# Patient Record
Sex: MALE | Race: BLACK OR AFRICAN AMERICAN
[De-identification: names, ages, dates, MRNs, and addresses within clinical notes are randomized per-mention and may not be internally consistent; named-entity substitution may affect disease eponyms.]

---

## 2017-10-16 ENCOUNTER — HOSPITAL ENCOUNTER (EMERGENCY)
Dept: HOSPITAL 87 - ER | Age: 42
LOS: 1 days | Discharge: LEFT BEFORE DISCHARGE | End: 2017-10-17
Payer: SELF-PAY

## 2017-10-16 VITALS — WEIGHT: 174.17 LBS | HEIGHT: 70 IN | BODY MASS INDEX: 24.93 KG/M2

## 2017-10-16 VITALS — SYSTOLIC BLOOD PRESSURE: 138 MMHG | DIASTOLIC BLOOD PRESSURE: 82 MMHG

## 2017-10-16 DIAGNOSIS — R10.84: Primary | ICD-10-CM

## 2017-10-16 DIAGNOSIS — R11.2: ICD-10-CM

## 2017-10-16 DIAGNOSIS — Z53.21: ICD-10-CM

## 2020-09-28 ENCOUNTER — HOSPITAL ENCOUNTER (INPATIENT)
Dept: HOSPITAL 72 - EMR | Age: 45
LOS: 3 days | Discharge: HOME | DRG: 282 | End: 2020-10-01
Payer: MEDICAID

## 2020-09-28 VITALS — DIASTOLIC BLOOD PRESSURE: 107 MMHG | SYSTOLIC BLOOD PRESSURE: 138 MMHG

## 2020-09-28 VITALS — DIASTOLIC BLOOD PRESSURE: 78 MMHG | SYSTOLIC BLOOD PRESSURE: 125 MMHG

## 2020-09-28 VITALS — DIASTOLIC BLOOD PRESSURE: 92 MMHG | SYSTOLIC BLOOD PRESSURE: 149 MMHG

## 2020-09-28 VITALS — DIASTOLIC BLOOD PRESSURE: 72 MMHG | SYSTOLIC BLOOD PRESSURE: 124 MMHG

## 2020-09-28 VITALS — BODY MASS INDEX: 25.19 KG/M2 | WEIGHT: 166.23 LBS | HEIGHT: 68 IN

## 2020-09-28 DIAGNOSIS — D72.819: ICD-10-CM

## 2020-09-28 DIAGNOSIS — K85.20: Primary | ICD-10-CM

## 2020-09-28 DIAGNOSIS — K92.2: ICD-10-CM

## 2020-09-28 DIAGNOSIS — F10.10: ICD-10-CM

## 2020-09-28 DIAGNOSIS — D63.8: ICD-10-CM

## 2020-09-28 DIAGNOSIS — E46: ICD-10-CM

## 2020-09-28 DIAGNOSIS — E53.8: ICD-10-CM

## 2020-09-28 DIAGNOSIS — N17.9: ICD-10-CM

## 2020-09-28 DIAGNOSIS — F12.90: ICD-10-CM

## 2020-09-28 DIAGNOSIS — E87.1: ICD-10-CM

## 2020-09-28 DIAGNOSIS — M62.82: ICD-10-CM

## 2020-09-28 DIAGNOSIS — E86.0: ICD-10-CM

## 2020-09-28 DIAGNOSIS — K29.70: ICD-10-CM

## 2020-09-28 LAB
ADD MANUAL DIFF: NO
ALBUMIN SERPL-MCNC: 5 G/DL (ref 3.4–5)
ALBUMIN/GLOB SERPL: 1.4 {RATIO} (ref 1–2.7)
ALP SERPL-CCNC: 57 U/L (ref 46–116)
ALT SERPL-CCNC: 33 U/L (ref 12–78)
ANION GAP SERPL CALC-SCNC: 13 MMOL/L (ref 5–15)
APPEARANCE UR: CLEAR
APTT PPP: (no result) S
AST SERPL-CCNC: 32 U/L (ref 15–37)
BASOPHILS NFR BLD AUTO: 4.8 % (ref 0–2)
BILIRUB SERPL-MCNC: 0.8 MG/DL (ref 0.2–1)
BUN SERPL-MCNC: 75 MG/DL (ref 7–18)
CALCIUM SERPL-MCNC: 10.2 MG/DL (ref 8.5–10.1)
CHLORIDE SERPL-SCNC: 89 MMOL/L (ref 98–107)
CK SERPL-CCNC: 635 U/L (ref 26–308)
CO2 SERPL-SCNC: 25 MMOL/L (ref 21–32)
CREAT SERPL-MCNC: 3.2 MG/DL (ref 0.55–1.3)
EOSINOPHIL NFR BLD AUTO: 0.6 % (ref 0–3)
ERYTHROCYTE [DISTWIDTH] IN BLOOD BY AUTOMATED COUNT: 11.5 % (ref 11.6–14.8)
GLOBULIN SER-MCNC: 3.5 G/DL
GLUCOSE UR STRIP-MCNC: NEGATIVE MG/DL
HCT VFR BLD CALC: 58.2 % (ref 42–52)
HGB BLD-MCNC: 19.6 G/DL (ref 14.2–18)
KETONES UR QL STRIP: NEGATIVE
LEUKOCYTE ESTERASE UR QL STRIP: NEGATIVE
LYMPHOCYTES NFR BLD AUTO: 32.1 % (ref 20–45)
MCV RBC AUTO: 83 FL (ref 80–99)
MONOCYTES NFR BLD AUTO: 8.4 % (ref 1–10)
NEUTROPHILS NFR BLD AUTO: 54.2 % (ref 45–75)
NITRITE UR QL STRIP: NEGATIVE
PH UR STRIP: 5 [PH] (ref 4.5–8)
PLATELET # BLD: 271 K/UL (ref 150–450)
POTASSIUM SERPL-SCNC: 4.3 MMOL/L (ref 3.5–5.1)
PROT UR QL STRIP: (no result)
RBC # BLD AUTO: 7.05 M/UL (ref 4.7–6.1)
SODIUM SERPL-SCNC: 127 MMOL/L (ref 136–145)
SP GR UR STRIP: 1.02 (ref 1–1.03)
UROBILINOGEN UR-MCNC: NORMAL MG/DL (ref 0–1)
WBC # BLD AUTO: 7.6 K/UL (ref 4.8–10.8)

## 2020-09-28 PROCEDURE — 80307 DRUG TEST PRSMV CHEM ANLYZR: CPT

## 2020-09-28 PROCEDURE — 84100 ASSAY OF PHOSPHORUS: CPT

## 2020-09-28 PROCEDURE — 83690 ASSAY OF LIPASE: CPT

## 2020-09-28 PROCEDURE — 84550 ASSAY OF BLOOD/URIC ACID: CPT

## 2020-09-28 PROCEDURE — 81003 URINALYSIS AUTO W/O SCOPE: CPT

## 2020-09-28 PROCEDURE — 87081 CULTURE SCREEN ONLY: CPT

## 2020-09-28 PROCEDURE — 86703 HIV-1/HIV-2 1 RESULT ANTBDY: CPT

## 2020-09-28 PROCEDURE — 86709 HEPATITIS A IGM ANTIBODY: CPT

## 2020-09-28 PROCEDURE — 96374 THER/PROPH/DIAG INJ IV PUSH: CPT

## 2020-09-28 PROCEDURE — 86140 C-REACTIVE PROTEIN: CPT

## 2020-09-28 PROCEDURE — 86803 HEPATITIS C AB TEST: CPT

## 2020-09-28 PROCEDURE — 96375 TX/PRO/DX INJ NEW DRUG ADDON: CPT

## 2020-09-28 PROCEDURE — 71045 X-RAY EXAM CHEST 1 VIEW: CPT

## 2020-09-28 PROCEDURE — 85025 COMPLETE CBC W/AUTO DIFF WBC: CPT

## 2020-09-28 PROCEDURE — 96361 HYDRATE IV INFUSION ADD-ON: CPT

## 2020-09-28 PROCEDURE — 84484 ASSAY OF TROPONIN QUANT: CPT

## 2020-09-28 PROCEDURE — 82270 OCCULT BLOOD FECES: CPT

## 2020-09-28 PROCEDURE — 82746 ASSAY OF FOLIC ACID SERUM: CPT

## 2020-09-28 PROCEDURE — 83880 ASSAY OF NATRIURETIC PEPTIDE: CPT

## 2020-09-28 PROCEDURE — 84443 ASSAY THYROID STIM HORMONE: CPT

## 2020-09-28 PROCEDURE — 83735 ASSAY OF MAGNESIUM: CPT

## 2020-09-28 PROCEDURE — 82150 ASSAY OF AMYLASE: CPT

## 2020-09-28 PROCEDURE — 84300 ASSAY OF URINE SODIUM: CPT

## 2020-09-28 PROCEDURE — 93005 ELECTROCARDIOGRAM TRACING: CPT

## 2020-09-28 PROCEDURE — 99291 CRITICAL CARE FIRST HOUR: CPT

## 2020-09-28 PROCEDURE — 83605 ASSAY OF LACTIC ACID: CPT

## 2020-09-28 PROCEDURE — 80053 COMPREHEN METABOLIC PANEL: CPT

## 2020-09-28 PROCEDURE — 82550 ASSAY OF CK (CPK): CPT

## 2020-09-28 PROCEDURE — 86705 HEP B CORE ANTIBODY IGM: CPT

## 2020-09-28 PROCEDURE — 74176 CT ABD & PELVIS W/O CONTRAST: CPT

## 2020-09-28 PROCEDURE — 36415 COLL VENOUS BLD VENIPUNCTURE: CPT

## 2020-09-28 PROCEDURE — 87340 HEPATITIS B SURFACE AG IA: CPT

## 2020-09-28 PROCEDURE — 82607 VITAMIN B-12: CPT

## 2020-09-28 PROCEDURE — 80061 LIPID PANEL: CPT

## 2020-09-28 PROCEDURE — 82977 ASSAY OF GGT: CPT

## 2020-09-28 PROCEDURE — 83036 HEMOGLOBIN GLYCOSYLATED A1C: CPT

## 2020-09-28 RX ADMIN — PANTOPRAZOLE SODIUM SCH MG: 40 INJECTION, POWDER, FOR SOLUTION INTRAVENOUS at 21:21

## 2020-09-28 NOTE — NUR
ED Nurse Note:

Patient has abnormal CR 3.2. Dr. Vazquez notified and ordered CT abd/pelvis 
without contrast.

## 2020-09-28 NOTE — NUR
NURSE NOTES:

Dr Valiente called asking for the pt's room no.asked for diet order but no order given, said 
he had not seen the pt.

## 2020-09-28 NOTE — CONSULTATION
Consult Note


Consult Note


I am asked to evaluate the patient at the request of Dr. Raines for renal failure





45-year-old male history of pancreatitis presents with recurrent epigastric pain

started 7 days ago patient reports drinking after his brothers death, he 

endorses sharp pain aggravated with alcohol no alleviating factors severity is 

moderate, constant he endorses nausea vomiting, no fevers no chills patient 

presents for evaluation and treatment


 No Known Allergies (Unverified , 1/17/20)





Patient has history of drinking alcohol and marijuana abuse





COVID-19 Screening


Contact w/high risk pt:  No


Experienced COVID-19 symptoms?:  No


COVID-19 Testing performed PTA:  No








Past Medical History:  No History, Except For


Hx Cardiac Problems:  No - pancretitis














Vital Signs








  Date Time  Temp Pulse Resp B/P (MAP) Pulse Ox O2 Delivery O2 Flow Rate FiO2


 


9/28/20 09:56 97.9 70 17 213/100 (137) 98 Room Air  








Sp02 EP Interpretation:  reviewed, normal








PHYSICAL EXAMINATION:


GENERAL:  Pleasant  man, seen in his room.


HEENT:  Normocephalic and atraumatic.  Sclerae anicteric.  Oropharynx


clear.


NECK:  Supple.


CHEST:  Clear to auscultation.


CARDIOVASCULAR:  Revealed a regular rate.


ABDOMEN:  Soft with minimal central tenderness to palpation without


guarding or rebound.


EXTREMITIES:  Revealed no edema.


NEUROLOGIC:  Exam was noted.





LABORATORY DATA:  Reviewed and most notably the patient is hemoconcentrated


and also he has a low sodium of 125 with renal failure and a creatinine of


2.2.  His total CPK was somewhat elevated, but he is receiving IV


fluids.





.


Assessment/Plan





Imp:


Acute renal failure, dehydration, possible rhabdo


Hyponatremia, electrolyte imbalances


EtOH abuse


Abdominal pain, gastritis, history of pancreatitis


Positive drug screen











Plan:


Hydrate with saline


Monitor renal parameters


Monitor hemoglobin hematocrit


Per orders











Jan Felipe MD            Sep 28, 2020 14:11

## 2020-09-28 NOTE — NUR
NURSE NOTES:

Dr. Felipe called and gave the following orders:

- NPO except meds

- heparin 5000 Q12hr

- He will put pain meds in himself.



Will input orders and continue to monitor.

## 2020-09-28 NOTE — EMERGENCY ROOM REPORT
History of Present Illness


General


Chief Complaint:  Abdominal Pain


Source:  Patient





Present Illness


HPI


45-year-old male history of pancreatitis presents with recurrent epigastric pain

started 7 days ago patient reports drinking after his brothers death, he 

endorses sharp pain aggravated with alcohol no alleviating factors severity is 

moderate, constant he endorses nausea vomiting, no fevers no chills patient 

presents for evaluation and treatment


Allergies:  


Coded Allergies:  


     No Known Allergies (Unverified , 1/17/20)





COVID-19 Screening


Contact w/high risk pt:  No


Experienced COVID-19 symptoms?:  No


COVID-19 Testing performed PTA:  No





Patient History


Past Medical History:  see triage record


Reviewed Nursing Documentation:  PMH: Agreed; PSxH: Agreed





Nursing Documentation-PMH


Past Medical History:  No History, Except For


Hx Cardiac Problems:  No - pancretitis





Review of Systems


All Other Systems:  negative except mentioned in HPI





Physical Exam





Vital Signs








  Date Time  Temp Pulse Resp B/P (MAP) Pulse Ox O2 Delivery O2 Flow Rate FiO2


 


9/28/20 09:56 97.9 70 17 213/100 (137) 98 Room Air  








Sp02 EP Interpretation:  reviewed, normal


General Appearance:  well appearing, no apparent distress, alert


Head:  normocephalic, atraumatic


Eyes:  bilateral eye PERRL, bilateral eye EOMI


ENT:  uvula midline, moist mucus membranes


Neck:  supple, thyroid normal, supple/symm/no masses


Respiratory:  lungs clear, no respiratory distress, no retraction, no accessory 

muscle use


Cardiovascular #1:  normal peripheral pulses, no edema, no gallop, no murmur, 

tachycardia


Gastrointestinal:  soft, no guarding, no rebound, tenderness - Epigastrically


Musculoskeletal:  normal inspection


Neurologic:  alert, oriented x3


Psychiatric:  mood/affect normal


Skin:  no rash, warm/dry





Procedures


Critical Care Time


Critical Care Time


Given the critical condition in which the patient arrived, the patient was 

immediately assessed by myself and the nurse, and cardiac monitoring initiated 

due to the potential for rapid decompensation of the patient's clinical 

condition. During the course of the patient's stay, I spent a considerable 

amount of time at the bedside performing serial re-evaluations of the patient's 

hemodynamic and clinical status because of the recognized potential threat to 

life or limb in this condition. I then had a chance to review not only all of 

the available current laboratory and radiographic studies obtained today, but I 

also reviewed old records available to me at the time. Additionally, any anci

[facility] information available including paramedic records were reviewed. 

Sequential vital signs were obtained. 





Critical Care time of 33 minutes was performed exclusive of billable procedures.





Medical Decision Making


Diagnostic Impression:  


   Primary Impression:  


   Abdominal pain


   Qualified Codes:  R10.9 - Unspecified abdominal pain


   Additional Impressions:  


   RAY (acute kidney injury)


   GI bleed


   Qualified Codes:  K92.2 - Gastrointestinal hemorrhage, unspecified


ER Course


45-year-old male presents with epigastric pain differential diagnosis includes 

alcoholic gastritis, pancreatitis, GI bleed, ulcer


Reevaluation 10:21 AM patient is vomiting blood


Patient given Protonix, patient also found to have an RAY, patient resuscitated 

with NS. 


Protonix for possible GI bleed


Patient admitted to Dr. Don Raines





Laboratory Tests








Test


 9/28/20


10:16 9/28/20


12:15


 


White Blood Count


 7.6 K/UL


(4.8-10.8) 





 


Red Blood Count


 7.05 M/UL


(4.70-6.10)  H 





 


Hemoglobin


 19.6 G/DL


(14.2-18.0)  *H 





 


Hematocrit


 58.2 %


(42.0-52.0)  H 





 


Mean Corpuscular Volume 83 FL (80-99)   


 


Mean Corpuscular Hemoglobin


 27.8 PG


(27.0-31.0) 





 


Mean Corpuscular Hemoglobin


Concent 33.7 G/DL


(32.0-36.0) 





 


Red Cell Distribution Width


 11.5 %


(11.6-14.8)  L 





 


Platelet Count


 271 K/UL


(150-450) 





 


Mean Platelet Volume


 6.7 FL


(6.5-10.1) 





 


Neutrophils (%) (Auto)


 54.2 %


(45.0-75.0) 





 


Lymphocytes (%) (Auto)


 32.1 %


(20.0-45.0) 





 


Monocytes (%) (Auto)


 8.4 %


(1.0-10.0) 





 


Eosinophils (%) (Auto)


 0.6 %


(0.0-3.0) 





 


Basophils (%) (Auto)


 4.8 %


(0.0-2.0)  H 





 


Sodium Level


 127 MMOL/L


(136-145)  L 





 


Potassium Level


 4.3 MMOL/L


(3.5-5.1) 





 


Chloride Level


 89 MMOL/L


()  L 





 


Carbon Dioxide Level


 25 MMOL/L


(21-32) 





 


Anion Gap


 13 mmol/L


(5-15) 





 


Blood Urea Nitrogen


 75 mg/dL


(7-18)  H 





 


Creatinine


 3.2 MG/DL


(0.55-1.30)  H 





 


Estimated Glomerular


Filtration Rate 25.3 mL/min


(>60) 





 


Glucose Level


 144 MG/DL


()  H 





 


Calcium Level


 10.2 MG/DL


(8.5-10.1)  H 





 


Total Bilirubin


 0.8 MG/DL


(0.2-1.0) 





 


Aspartate Amino Transferase


(AST) 32 U/L (15-37)


 





 


Alanine Aminotransferase (ALT)


 33 U/L (12-78)


 





 


Alkaline Phosphatase


 57 U/L


() 





 


Total Creatine Kinase


 635 U/L


()  H 





 


Troponin I


 0.003 ng/mL


(0.000-0.056) 





 


Total Protein


 8.5 G/DL


(6.4-8.2)  H 





 


Albumin


 5.0 G/DL


(3.4-5.0) 





 


Globulin 3.5 g/dL   


 


Albumin/Globulin Ratio 1.4 (1.0-2.7)   


 


Lipase


 345 U/L


() 





 


Urine Opiates Screen


 


 Negative


(NEGATIVE)


 


Urine Barbiturates Screen


 


 Negative


(NEGATIVE)


 


Phencyclidine (PCP) Screen


 


 Negative


(NEGATIVE)


 


Urine Amphetamines Screen


 


 Negative


(NEGATIVE)


 


Urine Benzodiazepines Screen


 


 Negative


(NEGATIVE)


 


Urine Cocaine Screen


 


 Negative


(NEGATIVE)


 


Urine Marijuana (THC) Screen


 


 Positive


(NEGATIVE)  H








Microbiology








 Date/Time


Source Procedure


Growth Status





 


 9/28/20 11:25


Nasopharynx SARS-CoV-2 RdRp Gene Assay - Final Complete








EKG Diagnostic Results


EKG Time:  10:00


EP Interpretation:  Sinus tachycardia, rate 105, QTc 417, no acute ST lesions, 

normal axis





Rhythm Strip Diag. Results


Rhythm Strip Time:  10:11


EP Interpretation:  yes


Rate:  110


Rhythm:  other - Sinus tachycardia





Chest X-Ray Diagnostic Results


Chest X-Ray Diagnostic Results :  


   Chest X-Ray Ordered:  Yes


   # of Views/Limited/Complete:  1 View


   Indication:  Chest Pain


   EP Interpretation:  Yes


   Interpretation:  no consolidation, no effusion, no pneumothorax, no acute 

cardiopulmonary disease


   Impression:  No acute disease


   Electronically Signed by:  Wesley Vazquez MD





CT/MRI/US Diagnostic Results


CT/MRI/US Diagnostic Results :  


   Impression


Procedure: CT Abdomen Pelvis WO Contrast


Indication: Abdominal pain


 


Technique: Spiral acquisitions obtained through the abdomen and pelvis. No oral


contrast utilized, per emergency room physician request No IV contrast utilized,

 per


referring physician request.. Multiplanar reconstructions were generated. Total 

dose


length product 236mGycm. CTDIvol(s) 4 mGy. Dose reduction achieved using 

automated


exposure control


 


 


Comparison: 1/17/2020


 


Findings: Lack of enteric contrast limits assessment of the GI tract. No 

evidence of


diverticulosis or diverticulitis. The appendix is normal. No small bowel 

distention.


No free or loculated intraperitoneal gas or fluid is evident. Distal esophagus,


stomach, duodenum are unremarkable.


 


Lack of IV contrast limits assessment of the solid organs. The liver, 

gallbladder,


bile ducts, pancreas, spleen, adrenals, kidneys are unremarkable. No 

retroperitoneal


or mesenteric mass or adenopathy. No pelvic mass or adenopathy.


 


The included lung bases are clear. The bones are unremarkable.


 


Impression: Limited assessment of the GI tract, due to lack of enteric contrast


administration


 


No definite acute or significant abnormality


 


 


 


 


 


 


 


The CT scanner at [facility] is accredited by the American College 

of


Radiology and the scans are performed using protocols designed to limit radi

ation


exposure to as low as reasonably achievable to attain images of sufficient 

resolution


adequate for diagnostic evaluation.


 














Dictated By:    Ted De Jesus MD                                 


Electronically Signed By:Ted De Jesus MD                                 


Signed Date/Time09/28/20 1412                                   








CC: Wesley Vazquez [id number]





Last Vital Signs








  Date Time  Temp Pulse Resp B/P (MAP) Pulse Ox O2 Delivery O2 Flow Rate FiO2


 


9/28/20 09:56 97.9 70 17 213/100 (137) 98 Room Air  








Disposition:  ADMITTED AS INPATIENT


Condition:  Critical


Scripts


No Active Prescriptions or Reported Meds











Wesley Vazquez MD          Sep 28, 2020 10:11

## 2020-09-28 NOTE — NUR
NURSE NOTES:

Dr. Raines called back saying that Matteo already put orders and to consult Eldonusha. Will 
call Dr. Felipe for a diet and DVT prophylaxis order. Will also ask for something for 
pain.

## 2020-09-28 NOTE — NUR
ED Nurse Note:

TRANSFER TO FLOOR:

Patient transferred to  as ordered.   Report given to JUAREZ Valdez. Patient 
transferred to SDU with JUAREZ Meyer and SADIE Dobson with all his belongings.

## 2020-09-28 NOTE — GENERAL PROGRESS NOTE
Subjective


Allergies:  


Coded Allergies:  


     No Known Allergies (Unverified , 1/17/20)





Objective





Last 24 Hour Vital Signs








  Date Time  Temp Pulse Resp B/P (MAP) Pulse Ox O2 Delivery O2 Flow Rate FiO2


 


9/28/20 16:00      Room Air  


 


9/28/20 15:36      Room Air  


 


9/28/20 15:35  73      


 


9/28/20 14:43  74 14 136/89 99 Room Air  


 


9/28/20 14:00 98.6 74 14 124/72 99 Room Air  


 


9/28/20 12:54 98.5 82 16 149/92 99 Room Air  


 


9/28/20 10:44 97.8       


 


9/28/20 10:37  98 16 138/107 99 Room Air  


 


9/28/20 10:15  110 18   Room Air  


 


9/28/20 09:56 97.9 70 17 213/100 (137) 98 Room Air  








Laboratory Tests


9/28/20 10:16: 


White Blood Count 7.6, Red Blood Count 7.05H, Hemoglobin 19.6*H, Hematocrit 

58.2H, Mean Corpuscular Volume 83, Mean Corpuscular Hemoglobin 27.8, Mean 

Corpuscular Hemoglobin Concent 33.7, Red Cell Distribution Width 11.5L, Platelet

Count 271, Mean Platelet Volume 6.7, Neutrophils (%) (Auto) 54.2, Lymphocytes 

(%) (Auto) 32.1, Monocytes (%) (Auto) 8.4, Eosinophils (%) (Auto) 0.6, Basophils

(%) (Auto) 4.8H, Sodium Level 127L, Potassium Level 4.3, Chloride Level 89L, 

Carbon Dioxide Level 25, Anion Gap 13, Blood Urea Nitrogen 75H, Creatinine 3.2H,

Estimat Glomerular Filtration Rate 25.3, Glucose Level 144H, Calcium Level 10.2H

, Total Bilirubin 0.8, Aspartate Amino Transf (AST/SGOT) 32, Alanine Am

inotransferase (ALT/SGPT) 33, Alkaline Phosphatase 57, Total Creatine Kinase 

635H, Troponin I 0.003, Total Protein 8.5H, Albumin 5.0, Globulin 3.5, 

Albumin/Globulin Ratio 1.4, Lipase 345


9/28/20 12:15: 


Urine Color Pale yellow, Urine Appearance Clear, Urine pH 5, Urine Specific 

Gravity 1.020, Urine Protein 2+H, Urine Glucose (UA) Negative, Urine Ketones 

Negative, Urine Blood 2+H, Urine Nitrite Negative, Urine Bilirubin Negative, 

Urine Urobilinogen Normal, Urine Leukocyte Esterase Negative, Urine RBC 2-4H, 

Urine WBC 2-4, Urine Squamous Epithelial Cells Occasional, Urine Bacteria Few, 

Urine Hyaline Casts 10-15H, Urine Random Sodium < 20L, Urine Opiates Screen 

Negative, Urine Barbiturates Screen Negative, Phencyclidine (PCP) Screen 

Negative, Urine Amphetamines Screen Negative, Urine Benzodiazepines Screen 

Negative, Urine Cocaine Screen Negative, Urine Marijuana (THC) Screen PositiveH


Height (Feet):  5


Height (Inches):  8.00


Weight (Pounds):  172





Assessment/Plan


Assessment/Plan:


Assessment


- abd pain, ? EtOH gastritis


- Etoh abuse


- dehydration, free water deficit


- h/o pancreatitis





Recommendations


- clear liquid


- PPI


- Follow labs and exam


- check OB





Thank you


MD Janice Camacho Payman MD             Sep 28, 2020 20:08

## 2020-09-28 NOTE — NUR
ED Nurse Note:

Patient c/o abdominal pain, cramping, N/V. Patient vomited pink colored emesis, 
approximately 30ml @ 1017. Dr. Baker notified and came to bedside. Placed 
patient in semi-vasquez position. Increased pain with high vasquez position. 
Emesis bag/ urinal at bedside. Provided comfort measures. Bed in lowset 
position.

## 2020-09-28 NOTE — DIAGNOSTIC IMAGING REPORT
Indication: Abdominal pain

 

Technique: Spiral acquisitions obtained through the abdomen and pelvis. No oral

contrast utilized, per emergency room physician request No IV contrast utilized,  per

referring physician request.. Multiplanar reconstructions were generated. Total dose

length product 236mGycm. CTDIvol(s) 4 mGy. Dose reduction achieved using automated

exposure control

 

 

Comparison: 1/17/2020

 

Findings: Lack of enteric contrast limits assessment of the GI tract. No evidence of

diverticulosis or diverticulitis. The appendix is normal. No small bowel distention.

No free or loculated intraperitoneal gas or fluid is evident. Distal esophagus,

stomach, duodenum are unremarkable.

 

Lack of IV contrast limits assessment of the solid organs. The liver, gallbladder,

bile ducts, pancreas, spleen, adrenals, kidneys are unremarkable. No retroperitoneal

or mesenteric mass or adenopathy. No pelvic mass or adenopathy.

 

The included lung bases are clear. The bones are unremarkable.

 

Impression: Limited assessment of the GI tract, due to lack of enteric contrast

administration

 

No definite acute or significant abnormality

 

 

 

 

 

 

 

The CT scanner at [facility] is accredited by the American College of

Radiology and the scans are performed using protocols designed to limit radiation

exposure to as low as reasonably achievable to attain images of sufficient resolution

adequate for diagnostic evaluation.

## 2020-09-28 NOTE — CONSULTATION
DATE OF CONSULTATION:  09/28/2020

GASTROENTEROLOGY CONSULTATION REPORT



CONSULTING PHYSICIAN:  So Camacho MD.



CHIEF COMPLAINT:  I was asked to see this patient for evaluation of

abdominal pain.



HISTORY OF PRESENT ILLNESS:  The patient is a 45-year-old 

man with a history of alcohol use who comes into the hospital with a 4 to

5 days of abdominal pain.  He has been vomiting for about 2 days.  He

reports the color has been green in the emesis.  He has had no bowel

movements.  He drinks few beers and also whisky daily.  He has had a past

history of pancreatitis.



FAMILY HISTORY:  Positive for pancreatitis in the mother.



SOCIAL HISTORY:  Patient is .  He has 4 children of his own and 4

adopted children.  He smokes marijuana and drinks alcohol as described.



REVIEW OF SYSTEMS:  The patient reports has drank red color juice before

his emesis today and therefore the red colored emesis in the emergency

room may have been the juice color.



PHYSICAL EXAMINATION:

GENERAL:  Pleasant  man, seen in his room.

HEENT:  Normocephalic and atraumatic.  Sclerae anicteric.  Oropharynx

clear.

NECK:  Supple.

CHEST:  Clear to auscultation.

CARDIOVASCULAR:  Revealed a regular rate.

ABDOMEN:  Soft with minimal central tenderness to palpation without

guarding or rebound.

EXTREMITIES:  Revealed no edema.

NEUROLOGIC:  Exam was noted.



LABORATORY DATA:  Reviewed and most notably the patient is hemoconcentrated

and also he has a low sodium of 125 with renal failure and a creatinine of

2.2.  His total CPK was somewhat elevated, but he is receiving IV

fluids.



ASSESSMENT:  This patient presents with abdominal pain, nausea, vomiting,

and he is volume depleted.  He should receive aggressive IV hydration and

his counts and numbers should be all followed.  A proton pump inhibitor

can be given and diet can be slowly advanced.  He may have had

upper gastrointestinal bleeding, but his blood counts can be followed and

the stools can be checked for Hemoccult.  A consideration can be made to

do an endoscopy if necessary.  He was strongly advised to discontinue

alcohol.



RECOMMENDATIONS:  Per above discussion and per orders written in the

chart.



Thank you for asking me to participate in the care of this patient.









  ______________________________________________

  So Camacho M.D.





DR:  YULY

D:  09/28/2020 19:44

T:  09/28/2020 19:56

JOB#:  [id number]

CC:



JOSE

## 2020-09-28 NOTE — NUR
NURSE HAND-OFF REPORT: 



Important Events on Shift:N/A

Patient Status: Stable

Diet: NPO



Pending Orders: N/A

Pending Results/Labs:

Pending MD notification:N/A



Latest Vital Signs: Temperature 98.5 , Pulse 73 , B/P 149 /92 , Respiratory Rate 16 , O2 SAT 
99 , Room Air, O2 Flow Rate .  

Vital Sign Comment: N/A



EKG Rhythm: Sinus Rhythm

Rhythm change?: N 

MD Notified?: -

MD Response: 



Latest Peña Fall Score: 0  

Fall Risk: Low Risk 

Safety Measures: Call light Within Reach, Bed Alarm Zone 1, Side Rails Side Rails x2, Bed 
position Low and Locked.

Fall Precautions: 



Report given to Beena Eaton RN.

## 2020-09-28 NOTE — NUR
NURSE NOTES:

Received pt ,a new admission fr ED brought to SDU per bed awake alert oriented accompanied 
by a transporter and ER RN Kristie.Pt on RA noted no resp distress,denies abd pain at this 
time,SR on the monitor,IV site to LH intact ,SR up x2 HOB elevated,bed lock in lowest 
position,call bell placed at bedside,instructed to use if assistance needed,verbalized 
understanding,will continue with plans of care.

## 2020-09-28 NOTE — NUR
ED Nurse Note:

Patient presents to ER due to right lower abdominal pain with N/V (bile). 
Patient was recently d/c from [facility] 2 days ago for same complaint. Patient 
reports he started drinking  beer 2 cans a day x 1 weekn as his brother 
recently passed away. Reports no cough, loss of taste or smell. Patient had 
Covid test and it was negative. Patient awake, alert, oriented x 4. Regular, 
unlabored breathing noted. Patient has mask on. Placed patient on cardiac 
monitor. Bed in lowest position. Call light within reach.

## 2020-09-28 NOTE — NUR
ED Nurse Note:

Patient resting in bed. Reports no pain. Bed in lowest position. Urinal at 
bedside.

## 2020-09-29 VITALS — DIASTOLIC BLOOD PRESSURE: 89 MMHG | SYSTOLIC BLOOD PRESSURE: 126 MMHG

## 2020-09-29 VITALS — SYSTOLIC BLOOD PRESSURE: 124 MMHG | DIASTOLIC BLOOD PRESSURE: 71 MMHG

## 2020-09-29 VITALS — DIASTOLIC BLOOD PRESSURE: 71 MMHG | SYSTOLIC BLOOD PRESSURE: 121 MMHG

## 2020-09-29 VITALS — SYSTOLIC BLOOD PRESSURE: 119 MMHG | DIASTOLIC BLOOD PRESSURE: 63 MMHG

## 2020-09-29 VITALS — DIASTOLIC BLOOD PRESSURE: 68 MMHG | SYSTOLIC BLOOD PRESSURE: 127 MMHG

## 2020-09-29 VITALS — SYSTOLIC BLOOD PRESSURE: 128 MMHG | DIASTOLIC BLOOD PRESSURE: 78 MMHG

## 2020-09-29 LAB
ADD MANUAL DIFF: NO
ALBUMIN SERPL-MCNC: 3.3 G/DL (ref 3.4–5)
ALBUMIN/GLOB SERPL: 1.6 {RATIO} (ref 1–2.7)
ALP SERPL-CCNC: 37 U/L (ref 46–116)
ALT SERPL-CCNC: 36 U/L (ref 12–78)
ANION GAP SERPL CALC-SCNC: 7 MMOL/L (ref 5–15)
AST SERPL-CCNC: 30 U/L (ref 15–37)
BASOPHILS NFR BLD AUTO: 1.7 % (ref 0–2)
BILIRUB SERPL-MCNC: 0.4 MG/DL (ref 0.2–1)
BUN SERPL-MCNC: 43 MG/DL (ref 7–18)
CALCIUM SERPL-MCNC: 7.6 MG/DL (ref 8.5–10.1)
CHLORIDE SERPL-SCNC: 101 MMOL/L (ref 98–107)
CHOLEST SERPL-MCNC: 99 MG/DL (ref ?–200)
CK SERPL-CCNC: 497 U/L (ref 26–308)
CO2 SERPL-SCNC: 26 MMOL/L (ref 21–32)
CREAT SERPL-MCNC: 1.9 MG/DL (ref 0.55–1.3)
EOSINOPHIL NFR BLD AUTO: 3 % (ref 0–3)
ERYTHROCYTE [DISTWIDTH] IN BLOOD BY AUTOMATED COUNT: 11.6 % (ref 11.6–14.8)
GAMMA GLUTAMYL TRANSPEPTIDASE: 18 U/L (ref 5–85)
GLOBULIN SER-MCNC: 2.1 G/DL
HCT VFR BLD CALC: 38.1 % (ref 42–52)
HDLC SERPL-MCNC: 34 MG/DL (ref 40–60)
HGB BLD-MCNC: 12.9 G/DL (ref 14.2–18)
LYMPHOCYTES NFR BLD AUTO: 36.7 % (ref 20–45)
MCV RBC AUTO: 83 FL (ref 80–99)
MONOCYTES NFR BLD AUTO: 9.9 % (ref 1–10)
NEUTROPHILS NFR BLD AUTO: 48.7 % (ref 45–75)
PHOSPHATE SERPL-MCNC: 2.4 MG/DL (ref 2.5–4.9)
PLATELET # BLD: 203 K/UL (ref 150–450)
POTASSIUM SERPL-SCNC: 3.6 MMOL/L (ref 3.5–5.1)
RBC # BLD AUTO: 4.57 M/UL (ref 4.7–6.1)
SODIUM SERPL-SCNC: 134 MMOL/L (ref 136–145)
TRIGL SERPL-MCNC: 76 MG/DL (ref 30–150)
WBC # BLD AUTO: 6.7 K/UL (ref 4.8–10.8)

## 2020-09-29 RX ADMIN — DIBASIC SODIUM PHOSPHATE, MONOBASIC POTASSIUM PHOSPHATE AND MONOBASIC SODIUM PHOSPHATE SCH MG: 852; 155; 130 TABLET ORAL at 13:27

## 2020-09-29 RX ADMIN — HEPARIN SODIUM SCH UNITS: 5000 INJECTION INTRAVENOUS; SUBCUTANEOUS at 08:44

## 2020-09-29 RX ADMIN — PANTOPRAZOLE SODIUM SCH MG: 40 INJECTION, POWDER, FOR SOLUTION INTRAVENOUS at 08:43

## 2020-09-29 RX ADMIN — HEPARIN SODIUM SCH UNITS: 5000 INJECTION INTRAVENOUS; SUBCUTANEOUS at 21:26

## 2020-09-29 RX ADMIN — DIBASIC SODIUM PHOSPHATE, MONOBASIC POTASSIUM PHOSPHATE AND MONOBASIC SODIUM PHOSPHATE SCH MG: 852; 155; 130 TABLET ORAL at 17:50

## 2020-09-29 NOTE — NUR
NURSE HAND-OFF REPORT: 



Important Events on Shift: Received orders. Pt no longer experiencing abdominal pain or 
nausea

Patient Status: stable

Diet: npo



Pending Orders: y

Pending Results/Labs:y

Pending MD notification:y



Latest Vital Signs: Temperature 97.4 , Pulse 85 , B/P 121 /71 , Respiratory Rate 20 , O2 SAT 
98 , Room Air, O2 Flow Rate .  

Vital Sign Comment: stable



EKG Rhythm: Sinus Rhythm

Rhythm change?: N 

MD Notified?: -

MD Response: 



Latest Peña Fall Score: 35  

Fall Risk: Medium Risk 

Safety Measures: Call light Within Reach, Bed Alarm Zone 1, Side Rails Side Rails x2, Bed 
position Low and Locked.

Fall Precautions: n

Yellow Socks 

Yellow Gown

Door Sign

Patient Fall Education y



Report given to JUAREZ Good.

## 2020-09-29 NOTE — NUR
CASE MANAGEMENT: REVIEW



45 YEAR OLD MALE PRESENT TO ED FROM HOME



CC: ABD PAIN X1 WEEK 





SI: RAY . RHABDOMYOLYSIS . DEHYDRATION . 

T 97.9  RR 17 /100 SAT 98% ROOM AIR

H/H 19.6/58.2  BUN 75 CR 3.2 TOTAL CREATINE KINASE  635 LIPASE 554 







IS: ZOFRAN IV X1

DILAUDID IV X1

NS IVF BOLUS X1

PROTONIX IV X1 







***PATIENT ADMITTED TO TELEMETRY UNIT 09/28/2020***

DCP: PATIENT IS FROM HOME

## 2020-09-29 NOTE — CARDIOLOGY REPORT
--------------- APPROVED REPORT --------------





EKG Measurement

Heart [id number]

WI [id number]

[id number]

[id number]

[id number]



<Conclusion>

Sinus tachycardia

Right atrial enlargement

Nonspecific T wave abnormality

Abnormal ECG

## 2020-09-29 NOTE — NUR
TRANSFER TO FLOOR:

Patient transferred to  206-2, per Dr friedman order.   Report given to Kj PIZARRO].  
Belongings and medications given to

patient. Family and or S/O informed of transfer.

## 2020-09-29 NOTE — NUR
NURSE NOTES:

Received report from JUAREZ Hargrove. Pt is awake, lying comfortably in bed, not in acute distress. 
Pt is alert and oriented, following instructions. Pt is NPO upon admission, denies abdominal 
pain. COVID neg., ambulatory, steady. Sinus Rhythm on the cardiac monitor. Skin is intact. 
IV is intact and patent. Will continue to monitor the pt.

## 2020-09-29 NOTE — NUR
NURSE NOTES:

Patient states that he has no episode of N/V since this morning. Will call Dr. Maynard for 
orders.

## 2020-09-29 NOTE — NUR
NURSE NOTES:

Received an order form Dr. Fontenot, patient may advance his diet from full liquid to regular 
diet. Will carry out.

## 2020-09-29 NOTE — NUR
NURSE NOTES:

RECEIVED PATIENT AND REPORT FROM TANVI PIZARRO. PATIENT WAS TRANSFERRED FROM SDU -1 TO TEL 
-3. PATIENT IS AAO X4 AND ABLE TO MAKE NEEDS KNOWN. DENIES ANY PAIN OR RESPIRATORY 
DISTRESS AT THIS TIME. SKIN IS INTACT AND WARM ON TOUCH. IV IS INTACT AND PATENT RUNNING NS 
@75CC/HR. BED IS IN LOWEST POSITION WITH BEDSIDE RAILS UP X2. BRAKES ENGAGED FOR SAFETY. 
CALL LIGHT IS WITHIN REACH WILL CONTINUE TO MONITOR PATIENT AND CONTINUE WITH THE PLAN OF 
CARE.

## 2020-09-29 NOTE — NUR
NURSE HAND-OFF REPORT: 



Important Events on Shift:OB STOOL COLLECTED AND SENT TO LAB

Patient Status: 

Diet: 



Pending Orders: 

Pending Results/Labs:

Pending MD notification:



Latest Vital Signs: Temperature 97.7 , Pulse 84 , B/P 127 /68 , Respiratory Rate 20 , O2 SAT 
98 , Room Air, O2 Flow Rate .  

Vital Sign Comment: STABLE



EKG Rhythm: Sinus Rhythm

Rhythm change?: N 

MD Notified?: -

MD Response: 



Latest Peña Fall Score: 35  

Fall Risk: Medium Risk 

Safety Measures: Call light Within Reach, Bed Alarm Zone 1, Side Rails Side Rails x2, Bed 
position Low and Locked.

Fall Precautions: 

Yellow Socks

Yellow Gown

Door Sign

Patient Fall Education



Report given to .

## 2020-09-29 NOTE — GENERAL PROGRESS NOTE
Subjective


ROS Limited/Unobtainable:  Yes


Allergies:  


Coded Allergies:  


     No Known Allergies (Unverified , 1/17/20)





Objective





Last 24 Hour Vital Signs








  Date Time  Temp Pulse Resp B/P (MAP) Pulse Ox O2 Delivery O2 Flow Rate FiO2


 


9/29/20 10:08  72      


 


9/29/20 08:00 97.5 74 20 119/63 (81) 100   





  74      


 


9/29/20 08:00      Room Air  


 


9/29/20 04:00  85      


 


9/29/20 04:00 97.4 80 20 121/71 (88) 98   


 


9/29/20 04:00      Room Air  


 


9/29/20 00:00      Room Air  


 


9/29/20 00:00 99.1 83 20 124/71 (88) 98   


 


9/29/20 00:00  79      


 


9/28/20 20:46 98.8 72 20 125/78 (94) 100   


 


9/28/20 20:00      Room Air  


 


9/28/20 20:00  75      


 


9/28/20 16:00      Room Air  


 


9/28/20 15:36      Room Air  


 


9/28/20 15:35  73      


 


9/28/20 14:43  74 14 136/89 99 Room Air  


 


9/28/20 14:00 98.6 74 14 124/72 99 Room Air  


 


9/28/20 12:54 98.5 82 16 149/92 99 Room Air  


 


9/28/20 10:44 97.8       


 


9/28/20 10:37  98 16 138/107 99 Room Air  

















Intake and Output  


 


 9/28/20 9/29/20





 19:00 07:00


 


Intake Total 3125 ml 


 


Output Total 430 ml 


 


Balance 2695 ml 


 


  


 


IV Total 3125 ml 


 


Output Urine Total 400 ml 


 


Emesis 30 ml 








Laboratory Tests


9/28/20 12:15: 


Urine Color Pale yellow, Urine Appearance Clear, Urine pH 5, Urine Specific 

Gravity 1.020, Urine Protein 2+H, Urine Glucose (UA) Negative, Urine Ketones 

Negative, Urine Blood 2+H, Urine Nitrite Negative, Urine Bilirubin Negative, 

Urine Urobilinogen Normal, Urine Leukocyte Esterase Negative, Urine RBC 2-4H, 

Urine WBC 2-4, Urine Squamous Epithelial Cells Occasional, Urine Bacteria Few, 

Urine Hyaline Casts 10-15H, Urine Random Sodium < 20L, Urine Opiates Screen 

Negative, Urine Barbiturates Screen Negative, Phencyclidine (PCP) Screen 

Negative, Urine Amphetamines Screen Negative, Urine Benzodiazepines Screen 

Negative, Urine Cocaine Screen Negative, Urine Marijuana (THC) Screen PositiveH


9/29/20 03:55: 


White Blood Count 6.7, Red Blood Count 4.57L, Hemoglobin 12.9#L, Hematocrit 3

8.1#L, Mean Corpuscular Volume 83, Mean Corpuscular Hemoglobin 28.1, Mean 

Corpuscular Hemoglobin Concent 33.7, Red Cell Distribution Width 11.6, Platelet 

Count 203, Mean Platelet Volume 5.7L, Neutrophils (%) (Auto) 48.7, Lymphocytes 

(%) (Auto) 36.7, Monocytes (%) (Auto) 9.9, Eosinophils (%) (Auto) 3.0, Basophils

(%) (Auto) 1.7, Sodium Level 134L, Potassium Level 3.6, Chloride Level 101, 

Carbon Dioxide Level 26, Anion Gap 7, Blood Urea Nitrogen 43H, Creatinine 1.9H, 

Estimat Glomerular Filtration Rate 46.3, Glucose Level 104, Hemoglobin A1c 6.0, 

Lactic Acid Level 0.60, Uric Acid 6.4, Calcium Level 7.6#L, Phosphorus Level 

2.4L, Magnesium Level 2.6H, Total Bilirubin 0.4, Gamma Glutamyl Transpeptidase 

18, Aspartate Amino Transf (AST/SGOT) 30, Alanine Aminotransferase (ALT/SGPT) 

36, Alkaline Phosphatase 37L, Total Creatine Kinase 497H, C-Reactive Protein, 

Quantitative < 0.4, Pro-B-Type Natriuretic Peptide 47, Total Protein 5.4#L, 

Albumin 3.3L, Globulin 2.1, Albumin/Globulin Ratio 1.6, Triglycerides Level 76, 

Cholesterol Level 99, LDL Cholesterol 54, HDL Cholesterol 34L, Cholesterol/HDL 

Ratio 2.9L, Lipase 554H, Vitamin B12 Level 1218H, Folate 6.2L, Thyroid 

Stimulating Hormone (TSH) 1.082


Height (Feet):  5


Height (Inches):  8.00


Weight (Pounds):  172


General Appearance:  alert


EENT:  normal ENT inspection


Neck:  normal inspection


Cardiovascular:  normal rate


Respiratory/Chest:  decreased breath sounds


Abdomen:  normal bowel sounds, non tender, soft


Extremities:  non-tender





Assessment/Plan


Problem List:  


(1) Alcohol abuse


ICD Codes:  F10.10 - Alcohol abuse, uncomplicated


SNOMED:  83267619


(2) Elevated lipase


ICD Codes:  R74.8 - Abnormal levels of other serum enzymes


SNOMED:  678557447


(3) Hyponatremia


ICD Codes:  E87.1 - Hypo-osmolality and hyponatremia


SNOMED:  03868565


(4) Dehydration


ICD Codes:  E86.0 - Dehydration


SNOMED:  52988857


(5) Abdominal pain


ICD Codes:  R10.9 - Unspecified abdominal pain


SNOMED:  53798149


Qualifiers:  


   Qualified Codes:  R10.9 - Unspecified abdominal pain


Assessment/Plan:


advance diet


ivf


repeat labs


ppi


GI procedures on hold











Lars Ruvalcaba MD             Sep 29, 2020 10:47

## 2020-09-29 NOTE — HISTORY AND PHYSICAL REPORT
DATE OF ADMISSION:  09/28/2020

DATE AND TIME SEEN:  09/29/2020 at 1 p.m.



CONSULTANTS:

1. Lars Ruvalcaba MD.

2. Jan Felipe MD.



CHIEF COMPLAINT:  Alcoholic pancreatitis, RAY.



BRIEF HISTORY:  This is a 45-year-old male, who lives at home admits to

drinking a lot of multiple alcohol over the past several days, came with

abdominal pain and nausea, diagnosed with alcoholic pancreatitis and RAY,

admitted to [facility] for further treatment.  Currently, calm in bed, feeling

little bit better.  No chest pain.  No short of breath.  Slight nausea.

No vomiting.  No diarrhea.



PAST MEDICAL HISTORY:  Includes RAY, pancreatitis, alcoholism.



PAST SURGICAL HISTORY:  None.



MEDICATIONS:  Include phosphorus, pantoprazole, folic acid, heparin,

hydromorphone, Reglan.



ALLERGIES:  Denies.



SOCIAL HISTORY:  No smoking.  Positive alcohol.  Positive marijuana use.



PHYSICAL EXAMINATION:

GENERAL:  Calm in bed, oriented x3, in no acute distress.

VITAL SIGNS:  Temperature is 98 degrees, pulse 65, respiratory rate 20,

blood pressure 126/89.

CARDIOVASCULAR:  No murmur.

LUNGS:  Distant and clear.

ABDOMEN:  Bowel sounds positive.  Nontender.  Nondistended.

EXTREMITIES:  No cyanosis or edema.

NEUROLOGIC:  Patient moves all extremities, slightly weak.



LABORATORY DATA:  Labs at this time show hemoglobin and hematocrit 12.9/38,

otherwise CBC is normal.  BMP shows sodium 134, BUN/creatinine 43/1.9.

Albumin 3.3.  Urinalysis is 2+ blood, 2+ protein.  Urine tox positive for

marijuana.



ASSESSMENT:

1. Alcoholic pancreatitis.

2. RAY.

3. Malnutrition.



PLAN:

1. Detox.

2. Dietary followup.

3. IV fluids.

4. Antiemetics p.r.n.

5. Pain control.

6. GI followup.

7. CBC, BMP in the morning.









  ______________________________________________

  Don Raines D.O.





DR:  ALFA

D:  09/29/2020 13:59

T:  09/29/2020 15:59

JOB#:  [id number]

CC:

## 2020-09-29 NOTE — NEPHROLOGY PROGRESS NOTE
Assessment/Plan


Problem List:  


(1) RAY (acute kidney injury)


(2) Rhabdomyolysis


(3) Hyponatremia


(4) Dehydration


(5) Elevated lipase


Assessment


Acute renal failure, dehydration, possible rhabdo


Hyponatremia, electrolyte imbalances


EtOH abuse


Abdominal pain, gastritis, history of pancreatitis


Positive drug screen


Plan


September 29: Normal saline down to 75 cc an hour.  Continue per GI.  Continue 

to monitor renal parameters.  Okay to transfer to [facility] from renal standpoint 

of view.





Hydrate with saline


Monitor renal parameters


Monitor hemoglobin hematocrit


Per orders





Subjective


ROS Limited/Unobtainable:  No


Constitutional:  Reports: malaise





Objective


Objective





Last 24 Hour Vital Signs








  Date Time  Temp Pulse Resp B/P (MAP) Pulse Ox O2 Delivery O2 Flow Rate FiO2


 


9/29/20 04:00  85      


 


9/29/20 04:00 97.4 80 20 121/71 (88) 98   


 


9/29/20 04:00      Room Air  


 


9/29/20 00:00      Room Air  


 


9/29/20 00:00 99.1 83 20 124/71 (88) 98   


 


9/29/20 00:00  79      


 


9/28/20 20:46 98.8 72 20 125/78 (94) 100   


 


9/28/20 20:00      Room Air  


 


9/28/20 20:00  75      


 


9/28/20 16:00      Room Air  


 


9/28/20 15:36      Room Air  


 


9/28/20 15:35  73      


 


9/28/20 14:43  74 14 136/89 99 Room Air  


 


9/28/20 14:00 98.6 74 14 124/72 99 Room Air  


 


9/28/20 12:54 98.5 82 16 149/92 99 Room Air  


 


9/28/20 10:44 97.8       


 


9/28/20 10:37  98 16 138/107 99 Room Air  


 


9/28/20 10:15  110 18   Room Air  


 


9/28/20 09:56 97.9 70 17 213/100 (137) 98 Room Air  

















Intake and Output  


 


 9/28/20 9/29/20





 19:00 07:00


 


Intake Total 3125 ml 


 


Output Total 430 ml 


 


Balance 2695 ml 


 


  


 


Intake IV Total 3125 ml 


 


Output Urine Total 400 ml 


 


Emesis 30 ml 








Laboratory Tests


9/28/20 10:16: 


White Blood Count 7.6, Red Blood Count 7.05H, Hemoglobin 19.6*H, Hematocrit 

58.2H, Mean Corpuscular Volume 83, Mean Corpuscular Hemoglobin 27.8, Mean C

orpuscular Hemoglobin Concent 33.7, Red Cell Distribution Width 11.5L, Platelet 

Count 271, Mean Platelet Volume 6.7, Neutrophils (%) (Auto) 54.2, Lymphocytes 

(%) (Auto) 32.1, Monocytes (%) (Auto) 8.4, Eosinophils (%) (Auto) 0.6, Basophils

(%) (Auto) 4.8H, Sodium Level 127L, Potassium Level 4.3, Chloride Level 89L, 

Carbon Dioxide Level 25, Anion Gap 13, Blood Urea Nitrogen 75H, Creatinine 3.2H,

Estimat Glomerular Filtration Rate 25.3, Glucose Level 144H, Calcium Level 10.2H

, Total Bilirubin 0.8, Aspartate Amino Transf (AST/SGOT) 32, Alanine 

Aminotransferase (ALT/SGPT) 33, Alkaline Phosphatase 57, Total Creatine Kinase 

635H, Troponin I 0.003, Total Protein 8.5H, Albumin 5.0, Globulin 3.5, 

Albumin/Globulin Ratio 1.4, Lipase 345


9/28/20 12:15: 


Urine Color Pale yellow, Urine Appearance Clear, Urine pH 5, Urine Specific 

Gravity 1.020, Urine Protein 2+H, Urine Glucose (UA) Negative, Urine Ketones 

Negative, Urine Blood 2+H, Urine Nitrite Negative, Urine Bilirubin Negative, 

Urine Urobilinogen Normal, Urine Leukocyte Esterase Negative, Urine RBC 2-4H, 

Urine WBC 2-4, Urine Squamous Epithelial Cells Occasional, Urine Bacteria Few, 

Urine Hyaline Casts 10-15H, Urine Random Sodium < 20L, Urine Opiates Screen 

Negative, Urine Barbiturates Screen Negative, Phencyclidine (PCP) Screen 

Negative, Urine Amphetamines Screen Negative, Urine Benzodiazepines Screen 

Negative, Urine Cocaine Screen Negative, Urine Marijuana (THC) Screen PositiveH


9/29/20 03:55: 


White Blood Count 6.7, Red Blood Count 4.57L, Hemoglobin 12.9#L, Hematocrit 

38.1#L, Mean Corpuscular Volume 83, Mean Corpuscular Hemoglobin 28.1, Mean 

Corpuscular Hemoglobin Concent 33.7, Red Cell Distribution Width 11.6, Platelet 

Count 203, Mean Platelet Volume 5.7L, Neutrophils (%) (Auto) 48.7, Lymphocytes 

(%) (Auto) 36.7, Monocytes (%) (Auto) 9.9, Eosinophils (%) (Auto) 3.0, Basophils

(%) (Auto) 1.7, Sodium Level 134L, Potassium Level 3.6, Chloride Level 101, 

Carbon Dioxide Level 26, Anion Gap 7, Blood Urea Nitrogen 43H, Creatinine 1.9H, 

Estimat Glomerular Filtration Rate 46.3, Glucose Level 104, Calcium Level 7.6#L,

Total Bilirubin 0.4, Aspartate Amino Transf (AST/SGOT) 30, Alanine Aminotrans

ferase (ALT/SGPT) 36, Alkaline Phosphatase 37L, Total Creatine Kinase 497H, 

Total Protein 5.4#L, Albumin 3.3L, Globulin 2.1, Albumin/Globulin Ratio 1.6, 

Lipase 554H, Hemoglobin A1c 6.0, Lactic Acid Level 0.60, Uric Acid 6.4, 

Phosphorus Level 2.4L, Magnesium Level 2.6H, Gamma Glutamyl Transpeptidase 18, 

C-Reactive Protein, Quantitative < 0.4, Pro-B-Type Natriuretic Peptide 47, 

Triglycerides Level 76, Cholesterol Level 99, LDL Cholesterol 54, HDL 

Cholesterol 34L, Cholesterol/HDL Ratio 2.9L, Vitamin B12 Level 1218H, Folate 

6.2L, Thyroid Stimulating Hormone (TSH) 1.082


Height (Feet):  5


Height (Inches):  8.00


Weight (Pounds):  172


General Appearance:  no apparent distress


Cardiovascular:  normal rate


Respiratory/Chest:  lungs clear


Abdomen:  soft, distended


Objective


No change











Jan Felipe MD            Sep 29, 2020 09:21

## 2020-09-29 NOTE — NUR
NURSE NOTES:

Received report from JUAREZ Leonard. Patient is awake, alert and oriented x 4. Cardiac monitor is 
in place, shows sinus rhythm with no chest pain reported. On room air, sating 97 %. On full 
liquid diet, instructed and amenable. IV site is on left hand g-20, running fluid of NS @ 
75cc/hour that is patent and intact. Safety measures are in place, bed in lowest and locked 
position, side rails up x2. Call light button and bedside table within reach, instructed to 
call for any assistance needed. Will continue plan of care.

## 2020-09-30 VITALS — DIASTOLIC BLOOD PRESSURE: 78 MMHG | SYSTOLIC BLOOD PRESSURE: 124 MMHG

## 2020-09-30 VITALS — SYSTOLIC BLOOD PRESSURE: 113 MMHG | DIASTOLIC BLOOD PRESSURE: 62 MMHG

## 2020-09-30 VITALS — SYSTOLIC BLOOD PRESSURE: 125 MMHG | DIASTOLIC BLOOD PRESSURE: 68 MMHG

## 2020-09-30 VITALS — SYSTOLIC BLOOD PRESSURE: 131 MMHG | DIASTOLIC BLOOD PRESSURE: 75 MMHG

## 2020-09-30 VITALS — DIASTOLIC BLOOD PRESSURE: 76 MMHG | SYSTOLIC BLOOD PRESSURE: 138 MMHG

## 2020-09-30 VITALS — SYSTOLIC BLOOD PRESSURE: 121 MMHG | DIASTOLIC BLOOD PRESSURE: 74 MMHG

## 2020-09-30 LAB
ADD MANUAL DIFF: NO
ALBUMIN SERPL-MCNC: 3 G/DL (ref 3.4–5)
ALBUMIN/GLOB SERPL: 1.2 {RATIO} (ref 1–2.7)
ALP SERPL-CCNC: 36 U/L (ref 46–116)
ALT SERPL-CCNC: 27 U/L (ref 12–78)
AMYLASE SERPL-CCNC: 88 U/L (ref 25–115)
ANION GAP SERPL CALC-SCNC: 5 MMOL/L (ref 5–15)
AST SERPL-CCNC: 26 U/L (ref 15–37)
BASOPHILS NFR BLD AUTO: 1.6 % (ref 0–2)
BILIRUB SERPL-MCNC: 0.2 MG/DL (ref 0.2–1)
BUN SERPL-MCNC: 20 MG/DL (ref 7–18)
CALCIUM SERPL-MCNC: 7.9 MG/DL (ref 8.5–10.1)
CHLORIDE SERPL-SCNC: 109 MMOL/L (ref 98–107)
CK SERPL-CCNC: 378 U/L (ref 26–308)
CO2 SERPL-SCNC: 27 MMOL/L (ref 21–32)
CREAT SERPL-MCNC: 1.5 MG/DL (ref 0.55–1.3)
EOSINOPHIL NFR BLD AUTO: 3.1 % (ref 0–3)
ERYTHROCYTE [DISTWIDTH] IN BLOOD BY AUTOMATED COUNT: 11.8 % (ref 11.6–14.8)
GLOBULIN SER-MCNC: 2.5 G/DL
HCT VFR BLD CALC: 35.2 % (ref 42–52)
HGB BLD-MCNC: 11.7 G/DL (ref 14.2–18)
LYMPHOCYTES NFR BLD AUTO: 44.3 % (ref 20–45)
MCV RBC AUTO: 85 FL (ref 80–99)
MONOCYTES NFR BLD AUTO: 11.7 % (ref 1–10)
NEUTROPHILS NFR BLD AUTO: 39.2 % (ref 45–75)
PHOSPHATE SERPL-MCNC: 2.1 MG/DL (ref 2.5–4.9)
PLATELET # BLD: 202 K/UL (ref 150–450)
POTASSIUM SERPL-SCNC: 4.4 MMOL/L (ref 3.5–5.1)
RBC # BLD AUTO: 4.15 M/UL (ref 4.7–6.1)
SODIUM SERPL-SCNC: 141 MMOL/L (ref 136–145)
WBC # BLD AUTO: 4.2 K/UL (ref 4.8–10.8)

## 2020-09-30 RX ADMIN — DIBASIC SODIUM PHOSPHATE, MONOBASIC POTASSIUM PHOSPHATE AND MONOBASIC SODIUM PHOSPHATE SCH MG: 852; 155; 130 TABLET ORAL at 08:20

## 2020-09-30 RX ADMIN — DIBASIC SODIUM PHOSPHATE, MONOBASIC POTASSIUM PHOSPHATE AND MONOBASIC SODIUM PHOSPHATE SCH MG: 852; 155; 130 TABLET ORAL at 18:17

## 2020-09-30 RX ADMIN — HEPARIN SODIUM SCH UNITS: 5000 INJECTION INTRAVENOUS; SUBCUTANEOUS at 08:22

## 2020-09-30 RX ADMIN — HEPARIN SODIUM SCH UNITS: 5000 INJECTION INTRAVENOUS; SUBCUTANEOUS at 22:31

## 2020-09-30 RX ADMIN — Medication SCH MG: at 08:42

## 2020-09-30 RX ADMIN — DIBASIC SODIUM PHOSPHATE, MONOBASIC POTASSIUM PHOSPHATE AND MONOBASIC SODIUM PHOSPHATE SCH MG: 852; 155; 130 TABLET ORAL at 13:54

## 2020-09-30 NOTE — CONSULTATION
History of Present Illness


General


Chief Complaint:  Abdominal Pain





Present Illness


Allergies:  


Coded Allergies:  


     No Known Allergies (Unverified , 1/17/20)





Medication History


Scheduled


Folic Acid* (Folic Acid*), 1 MG ORAL DAILY


Thiamine Mononitrate (Vitamin B-1), 100 MG ORAL DAILY





Patient History


Healthcare decision maker





Resuscitation status





Advanced Directive on File








Physical Exam





Last 24 Hour Vital Signs








  Date Time  Temp Pulse Resp B/P (MAP) Pulse Ox O2 Delivery O2 Flow Rate FiO2


 


9/30/20 09:00      Room Air  


 


9/30/20 08:00 99.1 82 18 113/62 (79) 98   


 


9/30/20 08:00  82      


 


9/30/20 04:00 99.2 85 19 138/76 (96) 99   


 


9/30/20 03:51  72      


 


9/30/20 00:00  84      


 


9/30/20 00:00 98.9 90 19 131/75 (93) 98   


 


9/29/20 21:00      Room Air  


 


9/29/20 20:00 100.2 93 20 128/78 (95) 97   


 


9/29/20 20:00  86      


 


9/29/20 16:00 97.7 84 20 127/68 (87) 98   


 


9/29/20 16:00  84      


 


9/29/20 12:00 98.4 65 18 126/89 (101) 100   





  65      


 


9/29/20 12:00  77      


 


9/29/20 12:00      Room Air  

















Intake and Output  


 


 9/29/20 9/30/20





 19:00 07:00


 


Intake Total 1165 ml 750 ml


 


Output Total 300 ml 


 


Balance 865 ml 750 ml


 


  


 


Intake Oral 1090 ml 


 


IV Total 75 ml 750 ml


 


Output Urine Total 300 ml 


 


# Voids 1 


 


# Bowel Movements 3 











Laboratory Tests








Test


 9/30/20


05:50


 


White Blood Count


 4.2 K/UL


(4.8-10.8)  L


 


Red Blood Count


 4.15 M/UL


(4.70-6.10)  L


 


Hemoglobin


 11.7 G/DL


(14.2-18.0)  L


 


Hematocrit


 35.2 %


(42.0-52.0)  L


 


Mean Corpuscular Volume 85 FL (80-99)  


 


Mean Corpuscular Hemoglobin


 28.1 PG


(27.0-31.0)


 


Mean Corpuscular Hemoglobin


Concent 33.1 G/DL


(32.0-36.0)


 


Red Cell Distribution Width


 11.8 %


(11.6-14.8)


 


Platelet Count


 202 K/UL


(150-450)


 


Mean Platelet Volume


 6.0 FL


(6.5-10.1)  L


 


Neutrophils (%) (Auto)


 39.2 %


(45.0-75.0)  L


 


Lymphocytes (%) (Auto)


 44.3 %


(20.0-45.0)


 


Monocytes (%) (Auto)


 11.7 %


(1.0-10.0)  H


 


Eosinophils (%) (Auto)


 3.1 %


(0.0-3.0)  H


 


Basophils (%) (Auto)


 1.6 %


(0.0-2.0)


 


Sodium Level


 141 MMOL/L


(136-145)


 


Potassium Level


 4.4 MMOL/L


(3.5-5.1)


 


Chloride Level


 109 MMOL/L


()  H


 


Carbon Dioxide Level


 27 MMOL/L


(21-32)


 


Anion Gap


 5 mmol/L


(5-15)


 


Blood Urea Nitrogen


 20 mg/dL


(7-18)  H


 


Creatinine


 1.5 MG/DL


(0.55-1.30)  H


 


Estimat Glomerular Filtration


Rate > 60 mL/min


(>60)


 


Glucose Level


 73 MG/DL


()  L


 


Uric Acid


 5.0 MG/DL


(2.6-7.2)


 


Calcium Level


 7.9 MG/DL


(8.5-10.1)  L


 


Phosphorus Level


 2.1 MG/DL


(2.5-4.9)  L


 


Magnesium Level


 2.1 MG/DL


(1.8-2.4)


 


Total Bilirubin


 0.2 MG/DL


(0.2-1.0)


 


Aspartate Amino Transf


(AST/SGOT) 26 U/L (15-37)





 


Alanine Aminotransferase


(ALT/SGPT) 27 U/L (12-78)





 


Alkaline Phosphatase


 36 U/L


()  L


 


Total Creatine Kinase


 378 U/L


()  H


 


C-Reactive Protein,


Quantitative 0.6 mg/dL


(0.00-0.90)


 


Pro-B-Type Natriuretic Peptide


 226 pg/mL


(0-125)  H


 


Total Protein


 5.5 G/DL


(6.4-8.2)  L


 


Albumin


 3.0 G/DL


(3.4-5.0)  L


 


Globulin 2.5 g/dL  


 


Albumin/Globulin Ratio 1.2 (1.0-2.7)  


 


Amylase Level


 88 U/L


()


 


Lipase


 520 U/L


()  H








Height (Feet):  5


Height (Inches):  8.00


Weight (Pounds):  172


Medications





Current Medications








 Medications


  (Trade)  Dose


 Ordered  Sig/Angelo


 Route


 PRN Reason  Start Time


 Stop Time Status Last Admin


Dose Admin


 


 Acetaminophen


  (Tylenol)  650 mg  Q4H  PRN


 ORAL


 Mild Pain (Pain Scale 1-3)  9/30/20 08:30


 10/30/20 08:29   





 


 Folic Acid


  (Folate)  1 mg  DAILY


 ORAL


   9/30/20 09:00


 10/30/20 08:59  9/30/20 08:23





 


 Heparin Sodium


  (Porcine)


  (Heparin 5000


 units/ml)  5,000 units  EVERY 12  HOURS


 SUBQ


   9/29/20 09:00


 11/13/20 08:59  9/30/20 08:22





 


 Metoclopramide HCl


  (Reglan)  10 mg  Q6H  PRN


 IVP


 Nausea & Vomiting  9/28/20 14:15


 10/28/20 14:14   





 


 Pantoprazole


  (Protonix)  40 mg  EVERY 12  HOURS


 ORAL


   9/29/20 09:30


 10/29/20 09:29  9/30/20 08:20





 


 Phosphorus


  (Phospha 250


 Neutral)  250 mg  THREE TIMES A  DAY


 ORAL


   9/29/20 13:00


 10/29/20 12:59  9/30/20 08:20





 


 Thiamine HCl


  (Vitamin B1)  100 mg  DAILY


 ORAL


   9/30/20 09:00


 10/30/20 08:59  9/30/20 08:42














Assessment/Plan


Assessment/Plan:


Hematology Consultation





REQ MD: Don Raines


RFC: Leukopenia eval


DOS: 9/30/2020





ID


45-year-old male history of pancreatitis presents with recurrent epigastric pain

started 7 days ago patient reports drinking after his brothers death, he 

endorses sharp pain aggravated with alcohol no alleviating factors severity is 

moderate, constant he endorses nausea vomiting, no fevers no chills patient 

presents for evaluation and treatment, labs reviewed, no bleeding, wbc is lower,

heme consulted.





Coded Allergies:  


     No Known Allergies (Unverified , 1/17/20)





COVID-19 Screening


Contact w/high risk pt:  No


Experienced COVID-19 symptoms?:  No


COVID-19 Testing performed PTA:  No





Patient History


Past Medical History:  see triage record


Reviewed Nursing Documentation:  PMH: Agreed; PSxH: Agreed





Nursing Documentation-PMH


Past Medical History:  No History, Except For


Hx Cardiac Problems:  No - pancretitis





Review of Systems


All Other Systems:  negative except mentioned in HPI





Physical Exam


General:  well appearing, no apparent distress, alert


Heent:  normocephalic, atraumatic


Neck:  supple, thyroid normal, supple/symm/no masses


Respiratory:  lungs clear, no respiratory distress, no retraction, no accessory 

muscle use


Cardiov:  normal peripheral pulses, no edema, no gallop, no murmur, tachycardia


GI:  soft, no guarding, no rebound, tenderness - Epigastrically


Musk:  normal inspection


Neurologic:  alert, oriented x3


Psychiatric:  mood/affect normal


Skin:  no rash, warm/dry





Labs: noted





Imaging: reviewed





Assessment and Recs:


# Leukopenia -- multiple etiologies could be related to underlying liver 

disease, etoh myelosuppresion


--> peripheral smear has been ordered and does not show significant abno

rmalities


--> Medications have been reviewed


--> Continue to monitor for improvement, trend cbc


--> Hep panel and HIV have been ordered


--> US abd ordered to r/o cirrhosis and hepatosplenomegaly 


--> reverse isolation if ANC is <2000


# Anemia of chronic disease due to underlying chronic medical issues, 

multifactorial v Gi bleed 


--> Anemia workup has been ordered, rule out gi bleed 


--> No evidence of hemolysis is noted, peripheral smear has been reviewed.


--> Hgb goal >7. Transfuse prn.


--> Epogen or iron at this time is not particularly indicated


--> Medications have been reviewed


--> low threshold for gi evaluation in case has occult +


# EToh use hx


--> hold off alcohol, other substances


# Abdominal pain


-> likely due to gastritis


--> is on ppi


# RAY


--> likely due to dehydration


# GI bleed


--> per vosoghi, again hold off endo


#  Dvt ppx scds





The timing of this note does not necessarily reflect the time of the patient was

seen.





Greatly appreciate consultation.











Lucas Fuchs MD          Sep 30, 2020 11:34

## 2020-09-30 NOTE — NUR
CASE MANAGEMENT:REVIEW



9/30/20

SI: DEHYDRATION. RHABDOMYOLYSIS

HYPONATREMIA. RAY

**SPIKED TEMP LAST NIGHT .2

98.7  79   18  121/74  99% ON RA

WBC+20   CR+1.5   CA-7.9   



IS: THIAMINE PO QD

FOLATE PO QD

PROTONIX PO Q12

HEPARIN SQ Q12

IVF@75/HR

**: TELEMETRY STATUS

DCP: FROM HOME

## 2020-09-30 NOTE — NUR
NURSE HAND-OFF REPORT: 



Important Events on Shift:N

Patient Status: 

Diet: REGULAR



Pending Orders: 

Pending Results/Labs:

Pending MD notification:



Latest Vital Signs: Temperature 98.9 , Pulse 73 , B/P 125 /68 , Respiratory Rate 18 , O2 SAT 
98 , Room Air, O2 Flow Rate .  

Vital Sign Comment: STABLE



EKG Rhythm: Sinus Rhythm

Rhythm change?: N 

MD Notified?: -

MD Response: 



Latest Peña Fall Score: 35  

Fall Risk: Medium Risk 

Safety Measures: Call light Within Reach, Bed Alarm Zone 1, Side Rails Side Rails x2, Bed 
position Low and Locked.

Fall Precautions: 

Yellow Socks

Yellow Gown

Door Sign

Patient Fall Education



Report given to .

-------------------------------------------------------------------------------

Addendum: 09/30/20 at 1944 by LAILA PERALTA RN

-------------------------------------------------------------------------------

HAND OFF REPORT VAIBHAV PIZARRO

## 2020-09-30 NOTE — NUR
NURSE NOTES:

RECEIVED REPORT FROM GARRET PIZARRO. DENIES ANY PAIN OR RESPIRATORY DISTRESS AT THIS TIME. SKIN 
IS INTACT. IV IS INTACT AND PATENT RUNNING NS @75CC/HR. BED IS IN LOWEST POSITION WITH 
BEDSIDE RAILS UP X2. BRAKES ENGAGED FOR SAFETY. CALL LIGHT IS WITHIN REACH WILL CONTINUE TO 
MONITOR PATIENT AND CONTINUE WITH THE PLAN OF CARE.

## 2020-09-30 NOTE — GENERAL PROGRESS NOTE
Subjective


ROS Limited/Unobtainable:  Yes


Allergies:  


Coded Allergies:  


     No Known Allergies (Unverified , 1/17/20)





Objective





Last 24 Hour Vital Signs








  Date Time  Temp Pulse Resp B/P (MAP) Pulse Ox O2 Delivery O2 Flow Rate FiO2


 


9/30/20 04:00 99.2 85 19 138/76 (96) 99   


 


9/30/20 03:51  72      


 


9/30/20 00:00  84      


 


9/30/20 00:00 98.9 90 19 131/75 (93) 98   


 


9/29/20 21:00      Room Air  


 


9/29/20 20:00 100.2 93 20 128/78 (95) 97   


 


9/29/20 20:00  86      


 


9/29/20 16:00 97.7 84 20 127/68 (87) 98   


 


9/29/20 16:00  84      


 


9/29/20 12:00 98.4 65 18 126/89 (101) 100   





  65      


 


9/29/20 12:00  77      


 


9/29/20 12:00      Room Air  


 


9/29/20 10:08  72      


 


9/29/20 08:00 97.5 74 20 119/63 (81) 100   





  74      


 


9/29/20 08:00      Room Air  

















Intake and Output  


 


 9/29/20 9/30/20





 18:59 06:59


 


Intake Total 1090 ml 825 ml


 


Output Total 300 ml 


 


Balance 790 ml 825 ml


 


  


 


Intake Oral 1090 ml 


 


IV Total  825 ml


 


Output Urine Total 300 ml 


 


# Voids 1 


 


# Bowel Movements 3 








Laboratory Tests


9/29/20 08:45: Stool Occult Blood [Pending]


9/30/20 05:50: 


White Blood Count 4.2L, Red Blood Count 4.15L, Hemoglobin 11.7L, Hematocrit 

35.2L, Mean Corpuscular Volume 85, Mean Corpuscular Hemoglobin 28.1, Mean 

Corpuscular Hemoglobin Concent 33.1, Red Cell Distribution Width 11.8, Platelet 

Count 202, Mean Platelet Volume 6.0L, Neutrophils (%) (Auto) 39.2L, Lymphocytes 

(%) (Auto) 44.3, Monocytes (%) (Auto) 11.7H, Eosinophils (%) (Auto) 3.1H, 

Basophils (%) (Auto) 1.6, Sodium Level [Pending], Potassium Level [Pending], 

Chloride Level [Pending], Carbon Dioxide Level [Pending], Blood Urea Nitrogen 

[Pending], Creatinine [Pending], Estimat Glomerular Filtration Rate [Pending], 

Glucose Level [Pending], Uric Acid [Pending], Calcium Level [Pending], 

Phosphorus Level [Pending], Magnesium Level [Pending], Total Bilirubin 

[Pending], Aspartate Amino Transf (AST/SGOT) [Pending], Alanine Aminotransferase

 (ALT/SGPT) [Pending], Alkaline Phosphatase [Pending], Total Creatine Kinase 

[Pending], C-Reactive Protein, Quantitative [Pending], Pro-B-Type Natriuretic 

Peptide [Pending], Total Protein [Pending], Albumin [Pending], Globulin 

[Pending], Amylase Level 88, Lipase 520H


Height (Feet):  5


Height (Inches):  8.00


Weight (Pounds):  172


General Appearance:  alert


EENT:  PERRL/EOMI


Neck:  supple


Cardiovascular:  normal rate


Respiratory/Chest:  lungs clear


Abdomen:  hypoactive bowel sounds, tender


Extremities:  non-tender





Assessment/Plan


Problem List:  


(1) Alcohol abuse


ICD Codes:  F10.10 - Alcohol abuse, uncomplicated


SNOMED:  67731802


(2) Elevated lipase


ICD Codes:  R74.8 - Abnormal levels of other serum enzymes


SNOMED:  633552013


(3) Hyponatremia


ICD Codes:  E87.1 - Hypo-osmolality and hyponatremia


SNOMED:  72649510


(4) Dehydration


ICD Codes:  E86.0 - Dehydration


SNOMED:  80909535


(5) Abdominal pain


ICD Codes:  R10.9 - Unspecified abdominal pain


SNOMED:  58622581


Qualifiers:  


   Qualified Codes:  R10.9 - Unspecified abdominal pain


Assessment/Plan:


advance diet to reg


ivf>>will dc


repeat labs


ppi


folic acid


GI procedures on hold











Lars Ruvalcaba MD             Sep 30, 2020 07:42

## 2020-09-30 NOTE — NUR
NURSE NOTES: 



Important Events on Shift: Receieved report from VERENICE Nagel RN. Pt dc'd IV. 

Patient Status: Stable

Diet: regular



Pending Orders: none

Pending Results/Labs: none 

Pending MD notification: none 



Latest Vital Signs: Temperature 98.6 , Pulse 90 , B/P 110 /75 , Respiratory Rate 18 , O2 SAT 
100 , Room Air, O2 Flow Rate .  

Vital Sign Comment: 



EKG Rhythm: Sinus rhythm

Rhythm change?: N 

MD Notified?: -

MD Response: 



Latest Peña Fall Score: 35  

Fall Risk: Medium Risk 

Safety Measures: Call light Within Reach, Bed Alarm Zone 1, Side Rails Side Rails x2, Bed 
position Low and Locked.

Fall Precautions: yes 

Yellow Socks yes 

Yellow Gown yes 

Door Sign yes 

Patient Fall Education yes

## 2020-09-30 NOTE — NEPHROLOGY PROGRESS NOTE
Assessment/Plan


Problem List:  


(1) RAY (acute kidney injury)


(2) Rhabdomyolysis


(3) Hyponatremia


(4) Dehydration


(5) Elevated lipase


Assessment


Acute renal failure, dehydration, possible rhabdo


Hyponatremia, electrolyte imbalances


EtOH abuse


Abdominal pain, gastritis, history of pancreatitis


Positive drug screen


Plan


September 30: Renal parameters improved.  CPK lower.  Patient's mental status 

baseline.  Okay to discharge from renal standpoint of view.  Patient will 

continue to abstain from using recreational drugs and increase oral liquid 

intake.


September 29: Normal saline down to 75 cc an hour.  Continue per GI.  Continue 

to monitor renal parameters.  Okay to transfer to [facility] from renal standpoint 

of view.





Hydrate with saline


Monitor renal parameters


Monitor hemoglobin hematocrit


Per orders





Subjective


ROS Limited/Unobtainable:  No





Objective


Objective





Last 24 Hour Vital Signs








  Date Time  Temp Pulse Resp B/P (MAP) Pulse Ox O2 Delivery O2 Flow Rate FiO2


 


9/30/20 04:00 99.2 85 19 138/76 (96) 99   


 


9/30/20 03:51  72      


 


9/30/20 00:00  84      


 


9/30/20 00:00 98.9 90 19 131/75 (93) 98   


 


9/29/20 21:00      Room Air  


 


9/29/20 20:00 100.2 93 20 128/78 (95) 97   


 


9/29/20 20:00  86      


 


9/29/20 16:00 97.7 84 20 127/68 (87) 98   


 


9/29/20 16:00  84      


 


9/29/20 12:00 98.4 65 18 126/89 (101) 100   





  65      


 


9/29/20 12:00  77      


 


9/29/20 12:00      Room Air  


 


9/29/20 10:08  72      

















Intake and Output  


 


 9/29/20 9/30/20





 19:00 07:00


 


Intake Total 1165 ml 750 ml


 


Output Total 300 ml 


 


Balance 865 ml 750 ml


 


  


 


Intake Oral 1090 ml 


 


IV Total 75 ml 750 ml


 


Output Urine Total 300 ml 


 


# Voids 1 


 


# Bowel Movements 3 











Current Medications








 Medications


  (Trade)  Dose


 Ordered  Sig/Angelo


 Route


 PRN Reason  Start Time


 Stop Time Status Last Admin


Dose Admin


 


 Folic Acid


  (Folate)  1 mg  DAILY


 ORAL


   9/30/20 09:00


 10/30/20 08:59  9/30/20 08:23





 


 Heparin Sodium


  (Porcine)


  (Heparin 5000


 units/ml)  5,000 units  EVERY 12  HOURS


 SUBQ


   9/29/20 09:00


 11/13/20 08:59  9/30/20 08:22





 


 Hydromorphone HCl


  (Dilaudid)  0.5 mg  Q4H  PRN


 IVP


 For Pain  9/28/20 20:30


 10/5/20 20:29   





 


 Metoclopramide HCl


  (Reglan)  10 mg  Q6H  PRN


 IVP


 Nausea & Vomiting  9/28/20 14:15


 10/28/20 14:14   





 


 Pantoprazole


  (Protonix)  40 mg  EVERY 12  HOURS


 ORAL


   9/29/20 09:30


 10/29/20 09:29  9/30/20 08:20





 


 Phosphorus


  (Phospha 250


 Neutral)  250 mg  THREE TIMES A  DAY


 ORAL


   9/29/20 13:00


 10/29/20 12:59  9/30/20 08:20








Laboratory Tests


9/29/20 08:45: Stool Occult Blood [Pending]


9/30/20 05:50: 


White Blood Count 4.2L, Red Blood Count 4.15L, Hemoglobin 11.7L, Hematocrit 

35.2L, Mean Corpuscular Volume 85, Mean Corpuscular Hemoglobin 28.1, Mean 

Corpuscular Hemoglobin Concent 33.1, Red Cell Distribution Width 11.8, Platelet 

Count 202, Mean Platelet Volume 6.0L, Neutrophils (%) (Auto) 39.2L, Lymphocytes 

(%) (Auto) 44.3, Monocytes (%) (Auto) 11.7H, Eosinophils (%) (Auto) 3.1H, 

Basophils (%) (Auto) 1.6, Sodium Level 141, Potassium Level 4.4, Chloride Level 

109H, Carbon Dioxide Level 27, Anion Gap 5, Blood Urea Nitrogen 20H, Creatinine 

1.5H, Estimat Glomerular Filtration Rate > 60, Glucose Level 73L, Uric Acid 5.0,

 Calcium Level 7.9L, Phosphorus Level 2.1L, Magnesium Level 2.1, Total Bilirubin

 0.2, Aspartate Amino Transf (AST/SGOT) 26, Alanine Aminotransferase (ALT/SGPT) 

27, Alkaline Phosphatase 36L, Total Creatine Kinase 378H, C-Reactive Protein, 

Quantitative 0.6, Pro-B-Type Natriuretic Peptide 226H, Total Protein 5.5L, 

Albumin 3.0L, Globulin 2.5, Albumin/Globulin Ratio 1.2, Amylase Level 88, Lipase

 520H


Height (Feet):  5


Height (Inches):  8.00


Weight (Pounds):  172


General Appearance:  no apparent distress


Cardiovascular:  normal rate


Respiratory/Chest:  lungs clear


Abdomen:  soft


Objective


No change











Jan Felipe MD            Sep 30, 2020 08:28

## 2020-09-30 NOTE — NUR
NURSE HAND-OFF REPORT: 



Important Events on Shift: Patient has been resting well the whole shift, no episode of N/V 
reported as well. Patient is now on regular diet.

Patient Status: Patient is awake on bed, in stable condition, with no complaints made at 
this time. Plan of care discussed.

Diet: Regular diet



Pending Orders: 

Pending Results/Labs:NONE

Pending MD notification:NONE



Latest Vital Signs: Temperature 99.2 , Pulse 85 , B/P 138 /76 , Respiratory Rate 19 , O2 SAT 
99 , Room Air, O2 Flow Rate .  

Vital Sign Comment: stable



EKG Rhythm: Sinus Rhythm

Rhythm change?: N 

MD Notified?: -

MD Response: 



Latest Peña Fall Score: 35  

Fall Risk: Medium Risk 

Safety Measures: Call light Within Reach, Bed Alarm Zone 1, Side Rails Side Rails x2, Bed 
position Low and Locked.

Fall Precautions: 

Yellow Socks

Yellow Gown

Door Sign

Patient Fall Education



Report given to JUAREZ Leonard.

## 2020-09-30 NOTE — GENERAL PROGRESS NOTE
Subjective


Constitutional:  Reports: weakness


Allergies:  


Coded Allergies:  


     No Known Allergies (Unverified , 1/17/20)


All Systems:  reviewed and negative except above


Subjective


calm in bed





Objective





Last 24 Hour Vital Signs








  Date Time  Temp Pulse Resp B/P (MAP) Pulse Ox O2 Delivery O2 Flow Rate FiO2


 


9/30/20 04:00 99.2 85 19 138/76 (96) 99   


 


9/30/20 03:51  72      


 


9/30/20 00:00  84      


 


9/30/20 00:00 98.9 90 19 131/75 (93) 98   


 


9/29/20 21:00      Room Air  


 


9/29/20 20:00 100.2 93 20 128/78 (95) 97   


 


9/29/20 20:00  86      


 


9/29/20 16:00 97.7 84 20 127/68 (87) 98   


 


9/29/20 16:00  84      


 


9/29/20 12:00 98.4 65 18 126/89 (101) 100   





  65      


 


9/29/20 12:00  77      


 


9/29/20 12:00      Room Air  


 


9/29/20 10:08  72      

















Intake and Output  


 


 9/29/20 9/30/20





 19:00 07:00


 


Intake Total 1165 ml 750 ml


 


Output Total 300 ml 


 


Balance 865 ml 750 ml


 


  


 


Intake Oral 1090 ml 


 


IV Total 75 ml 750 ml


 


Output Urine Total 300 ml 


 


# Voids 1 


 


# Bowel Movements 3 








Laboratory Tests


9/29/20 08:45: Stool Occult Blood [Pending]


9/30/20 05:50: 


White Blood Count 4.2L, Red Blood Count 4.15L, Hemoglobin 11.7L, Hematocrit 

35.2L, Mean Corpuscular Volume 85, Mean Corpuscular Hemoglobin 28.1, Mean 

Corpuscular Hemoglobin Concent 33.1, Red Cell Distribution Width 11.8, Platelet 

Count 202, Mean Platelet Volume 6.0L, Neutrophils (%) (Auto) 39.2L, Lymphocytes 

(%) (Auto) 44.3, Monocytes (%) (Auto) 11.7H, Eosinophils (%) (Auto) 3.1H, 

Basophils (%) (Auto) 1.6, Sodium Level 141, Potassium Level 4.4, Chloride Level 

109H, Carbon Dioxide Level 27, Anion Gap 5, Blood Urea Nitrogen 20H, Creatinine 

1.5H, Estimat Glomerular Filtration Rate > 60, Glucose Level 73L, Uric Acid 5.0,

 Calcium Level 7.9L, Phosphorus Level 2.1L, Magnesium Level 2.1, Total Bilirubin

 0.2, Aspartate Amino Transf (AST/SGOT) 26, Alanine Aminotransferase (ALT/SGPT) 

27, Alkaline Phosphatase 36L, Total Creatine Kinase 378H, C-Reactive Protein, 

Quantitative 0.6, Pro-B-Type Natriuretic Peptide 226H, Total Protein 5.5L, 

Albumin 3.0L, Globulin 2.5, Albumin/Globulin Ratio 1.2, Amylase Level 88, Lipase

 520H


Height (Feet):  5


Height (Inches):  8.00


Weight (Pounds):  172


General Appearance:  alert


EENT:  normal ENT inspection


Neck:  normal alignment


Cardiovascular:  normal peripheral pulses, normal rate, regular rhythm


Respiratory/Chest:  chest wall non-tender, lungs clear, normal breath sounds


Abdomen:  normal bowel sounds, non tender, soft


Extremities:  normal inspection


Edema:  no edema noted Arm (L), no edema noted Arm (R), no edema noted Leg (L), 

no edema noted Leg (R), no edema noted Pedal (L), no edema noted Pedal (R), no 

edema noted Generalized


Neurologic:  responsive, motor weakness


Skin:  normal pigmentation, warm/dry





Assessment/Plan


Problem List:  


(1) Leukopenia


ICD Codes:  D72.819 - Decreased white blood cell count, unspecified


SNOMED:  51418233, 781937782


(2) Acute kidney injury


ICD Codes:  N17.9 - Acute kidney failure, unspecified


SNOMED:  2842628, 29150319


(3) Pancreatitis


ICD Codes:  K85.90 - Acute pancreatitis without necrosis or infection, 

unspecified


SNOMED:  78145766


Status:  stable, progressing


Assessment/Plan:


adv diet pain control cbc bmp am gi heme Don Browning DO        Sep 30, 2020 08:29

## 2020-10-01 VITALS — SYSTOLIC BLOOD PRESSURE: 124 MMHG | DIASTOLIC BLOOD PRESSURE: 71 MMHG

## 2020-10-01 VITALS — DIASTOLIC BLOOD PRESSURE: 75 MMHG | SYSTOLIC BLOOD PRESSURE: 110 MMHG

## 2020-10-01 RX ADMIN — Medication SCH MG: at 09:57

## 2020-10-01 NOTE — GENERAL PROGRESS NOTE
Subjective


Constitutional:  Reports: weakness


Allergies:  


Coded Allergies:  


     No Known Allergies (Unverified , 1/17/20)


All Systems:  reviewed and negative except above


Subjective


calm in bed eating, wants to leave





Objective





Last 24 Hour Vital Signs








  Date Time  Temp Pulse Resp B/P (MAP) Pulse Ox O2 Delivery O2 Flow Rate FiO2


 


10/1/20 10:00  78      


 


10/1/20 09:00      Room Air  


 


10/1/20 08:00 98.2 69 18 124/71 (88) 97   


 


10/1/20 04:03  57      


 


10/1/20 00:00 98.6 90 18 110/75 (87) 100   


 


10/1/20 00:00  79      


 


9/30/20 21:00      Room Air  


 


9/30/20 20:00  86      


 


9/30/20 20:00 99.4 74 20 124/78 (93) 99   


 


9/30/20 16:00 98.9 73 18 125/68 (87) 98   


 


9/30/20 16:00  73      








Height (Feet):  5


Height (Inches):  8.00


Weight (Pounds):  172


General Appearance:  alert


EENT:  normal ENT inspection


Neck:  normal alignment


Cardiovascular:  normal peripheral pulses, normal rate, regular rhythm


Respiratory/Chest:  chest wall non-tender, lungs clear, normal breath sounds


Abdomen:  normal bowel sounds, non tender, soft


Extremities:  normal inspection


Edema:  no edema noted Arm (L), no edema noted Arm (R), no edema noted Leg (L), 

no edema noted Leg (R), no edema noted Pedal (L), no edema noted Pedal (R), no 

edema noted Generalized


Neurologic:  responsive, motor weakness


Skin:  normal pigmentation, warm/dry





Assessment/Plan


Problem List:  


(1) Leukopenia


ICD Codes:  D72.819 - Decreased white blood cell count, unspecified


SNOMED:  96722671, 930053657


(2) Acute kidney injury


ICD Codes:  N17.9 - Acute kidney failure, unspecified


SNOMED:  1138515, 22336249


(3) Pancreatitis


ICD Codes:  K85.90 - Acute pancreatitis without necrosis or infection, 

unspecified


SNOMED:  59724252


Status:  stable, progressing


Assessment/Plan:


adv diet pain control cbc bmp am dc if gi clear











Don Raines DO         Oct 1, 2020 12:38

## 2020-10-01 NOTE — HEMATOLOGY/ONC PROGRESS NOTE
Assessment/Plan


Assessment/Plan


Assessment and Recs:


# Leukopenia -- multiple etiologies could be related to underlying liver 

disease, etoh myelosuppresion


--> peripheral smear has been ordered and does not show significant 

abnormalities


--> Medications have been reviewed


--> Continue to monitor for improvement, trend cbc


--> Hep panel and HIV have been ordered


--> US abd ordered to r/o cirrhosis and hepatosplenomegaly 


--> reverse isolation if ANC is <2000


# Anemia of chronic disease due to underlying chronic medical issues, 

multifactorial v Gi bleed 


--> Anemia workup has been ordered, rule out gi bleed 


--> No evidence of hemolysis is noted, peripheral smear has been reviewed.


--> Hgb goal >7. Transfuse prn.


--> Epogen or iron at this time is not particularly indicated


--> Medications have been reviewed


--> low threshold for gi evaluation in case has occult +


# EToh use hx


--> hold off alcohol, other substances


# Abdominal pain


-> likely due to gastritis


--> is on ppi


# RAY


--> likely due to dehydration


# GI bleed


--> per vosoghi, again hold off endo


#  Dvt ppx hep sq





The timing of this note does not necessarily reflect the time of the patient was

seen.





Greatly appreciate consultation.





Subjective


Constitutional:  Denies: no symptoms, chills, fever, malaise, weakness, other


Cardiovascular:  Denies: no symptoms, chest pain, edema, irregular heart rate, 

lightheadedness, palpitations, syncope, other


Respiratory:  Denies: no symptoms, cough, shortness of breath, SOB with 

excertion, SOB at rest, sputum, wheezing, other


Gastrointestinal/Abdominal:  Denies: no symptoms, abdomen distended, abdominal 

pain, black stools, tarry stools, blood in stool, constipated, diarrhea, 

difficulty swallowing, nausea, poor appetite, poor fluid intake, rectal 

bleeding, vomiting, other


Genitourinary:  Denies: no symptoms, burning, discharge, frequency, flank pain, 

hematuria, incontinence, pain, urgency, other


Neurologic/Psychiatric:  Denies: no symptoms, anxiety, depressed, emotional 

problems, headache, numbness, paresthesia, pre-existing deficit, seizure, 

tingling, tremors, weakness, other


Endocrine:  Denies: no symptoms, excessive sweating, flushing, intolerance to 

cold, intolerance to heat, increased hunger, increased thirst, increased urine, 

unexplained weight gain, unexplained weight loss, other


Hematologic/Lymphatic:  Denies: no symptoms, anemia, easy bleeding, easy 

bruising, adenopathy, other


Allergies:  


Coded Allergies:  


     No Known Allergies (Unverified , 1/17/20)


Subjective


10/1 very agitated overnight, wants to leave, no temp noted, on hep sq





Objective


Objective





Current Medications








 Medications


  (Trade)  Dose


 Ordered  Sig/Angelo


 Route


 PRN Reason  Start Time


 Stop Time Status Last Admin


Dose Admin


 


 Acetaminophen


  (Tylenol)  650 mg  Q4H  PRN


 ORAL


 Mild Pain (Pain Scale 1-3)  9/30/20 08:30


 10/30/20 08:29  9/30/20 13:55





 


 Folic Acid


  (Folate)  1 mg  DAILY


 ORAL


   9/30/20 09:00


 10/30/20 08:59  9/30/20 08:23





 


 Heparin Sodium


  (Porcine)


  (Heparin 5000


 units/ml)  5,000 units  EVERY 12  HOURS


 SUBQ


   9/29/20 09:00


 11/13/20 08:59  9/30/20 22:31





 


 Metoclopramide HCl


  (Reglan)  10 mg  Q6H  PRN


 IVP


 Nausea & Vomiting  9/28/20 14:15


 10/28/20 14:14   





 


 Pantoprazole


  (Protonix)  40 mg  EVERY 12  HOURS


 ORAL


   9/29/20 09:30


 10/29/20 09:29  9/30/20 22:31





 


 Phosphorus


  (Phospha 250


 Neutral)  250 mg  THREE TIMES A  DAY


 ORAL


   9/29/20 13:00


 10/29/20 12:59  9/30/20 18:17





 


 Thiamine HCl


  (Vitamin B1)  100 mg  DAILY


 ORAL


   9/30/20 09:00


 10/30/20 08:59  9/30/20 08:42














Last 24 Hour Vital Signs








  Date Time  Temp Pulse Resp B/P (MAP) Pulse Ox O2 Delivery O2 Flow Rate FiO2


 


10/1/20 04:03  57      


 


10/1/20 00:00 98.6 90 18 110/75 (87) 100   


 


10/1/20 00:00  79      


 


9/30/20 21:00      Room Air  


 


9/30/20 20:00  86      


 


9/30/20 20:00 99.4 74 20 124/78 (93) 99   


 


9/30/20 16:00 98.9 73 18 125/68 (87) 98   


 


9/30/20 16:00  73      


 


9/30/20 12:00  79      


 


9/30/20 12:00 98.7 79 18 121/74 (90) 99   


 


9/30/20 09:00      Room Air  


 


9/30/20 08:00 99.1 82 18 113/62 (79) 98   


 


9/30/20 08:00  82      


 


9/30/20 04:00 99.2 85 19 138/76 (96) 99   


 


9/30/20 03:51  72      


 


9/30/20 00:00  84      


 


9/30/20 00:00 98.9 90 19 131/75 (93) 98   


 


9/29/20 21:00      Room Air  


 


9/29/20 20:00 100.2 93 20 128/78 (95) 97   


 


9/29/20 20:00  86      


 


9/29/20 16:00 97.7 84 20 127/68 (87) 98   


 


9/29/20 16:00  84      


 


9/29/20 12:00 98.4 65 18 126/89 (101) 100   





  65      


 


9/29/20 12:00  77      


 


9/29/20 12:00      Room Air  


 


9/29/20 10:08  72      


 


9/29/20 08:00 97.5 74 20 119/63 (81) 100   





  74      


 


9/29/20 08:00      Room Air  











Labs








Test


 9/28/20


10:16 9/28/20


12:15 9/29/20


03:55 9/29/20


08:45


 


White Blood Count


 7.6 K/UL


(4.8-10.8) 


 6.7 K/UL


(4.8-10.8) 





 


Red Blood Count


 7.05 M/UL


(4.70-6.10) 


 4.57 M/UL


(4.70-6.10) 





 


Hemoglobin


 19.6 G/DL


(14.2-18.0) 


 12.9 G/DL


(14.2-18.0) 





 


Hematocrit


 58.2 %


(42.0-52.0) 


 38.1 %


(42.0-52.0) 





 


Mean Corpuscular Volume 83 FL (80-99)   83 FL (80-99)  


 


Mean Corpuscular Hemoglobin


 27.8 PG


(27.0-31.0) 


 28.1 PG


(27.0-31.0) 





 


Mean Corpuscular Hemoglobin


Concent 33.7 G/DL


(32.0-36.0) 


 33.7 G/DL


(32.0-36.0) 





 


Red Cell Distribution Width


 11.5 %


(11.6-14.8) 


 11.6 %


(11.6-14.8) 





 


Platelet Count


 271 K/UL


(150-450) 


 203 K/UL


(150-450) 





 


Mean Platelet Volume


 6.7 FL


(6.5-10.1) 


 5.7 FL


(6.5-10.1) 





 


Neutrophils (%) (Auto)


 54.2 %


(45.0-75.0) 


 48.7 %


(45.0-75.0) 





 


Lymphocytes (%) (Auto)


 32.1 %


(20.0-45.0) 


 36.7 %


(20.0-45.0) 





 


Monocytes (%) (Auto)


 8.4 %


(1.0-10.0) 


 9.9 %


(1.0-10.0) 





 


Eosinophils (%) (Auto)


 0.6 %


(0.0-3.0) 


 3.0 %


(0.0-3.0) 





 


Basophils (%) (Auto)


 4.8 %


(0.0-2.0) 


 1.7 %


(0.0-2.0) 





 


Sodium Level


 127 MMOL/L


(136-145) 


 134 MMOL/L


(136-145) 





 


Potassium Level


 4.3 MMOL/L


(3.5-5.1) 


 3.6 MMOL/L


(3.5-5.1) 





 


Chloride Level


 89 MMOL/L


() 


 101 MMOL/L


() 





 


Carbon Dioxide Level


 25 MMOL/L


(21-32) 


 26 MMOL/L


(21-32) 





 


Anion Gap


 13 mmol/L


(5-15) 


 7 mmol/L


(5-15) 





 


Blood Urea Nitrogen


 75 mg/dL


(7-18) 


 43 mg/dL


(7-18) 





 


Creatinine


 3.2 MG/DL


(0.55-1.30) 


 1.9 MG/DL


(0.55-1.30) 





 


Estimat Glomerular Filtration


Rate 25.3 mL/min


(>60) 


 46.3 mL/min


(>60) 





 


Glucose Level


 144 MG/DL


() 


 104 MG/DL


() 





 


Calcium Level


 10.2 MG/DL


(8.5-10.1) 


 7.6 MG/DL


(8.5-10.1) 





 


Total Bilirubin


 0.8 MG/DL


(0.2-1.0) 


 0.4 MG/DL


(0.2-1.0) 





 


Aspartate Amino Transf


(AST/SGOT) 32 U/L (15-37) 


 


 30 U/L (15-37) 


 





 


Alanine Aminotransferase


(ALT/SGPT) 33 U/L (12-78) 


 


 36 U/L (12-78) 


 





 


Alkaline Phosphatase


 57 U/L


() 


 37 U/L


() 





 


Total Creatine Kinase


 635 U/L


() 


 497 U/L


() 





 


Troponin I


 0.003 ng/mL


(0.000-0.056) 


 


 





 


Total Protein


 8.5 G/DL


(6.4-8.2) 


 5.4 G/DL


(6.4-8.2) 





 


Albumin


 5.0 G/DL


(3.4-5.0) 


 3.3 G/DL


(3.4-5.0) 





 


Globulin 3.5 g/dL   2.1 g/dL  


 


Albumin/Globulin Ratio 1.4 (1.0-2.7)   1.6 (1.0-2.7)  


 


Lipase


 345 U/L


() 


 554 U/L


() 





 


Urine Color  Pale yellow   


 


Urine Appearance  Clear   


 


Urine pH  5 (4.5-8.0)   


 


Urine Specific Gravity


 


 1.020


(1.005-1.035) 


 





 


Urine Protein  2+ (NEGATIVE)   


 


Urine Glucose (UA)


 


 Negative


(NEGATIVE) 


 





 


Urine Ketones


 


 Negative


(NEGATIVE) 


 





 


Urine Blood  2+ (NEGATIVE)   


 


Urine Nitrite


 


 Negative


(NEGATIVE) 


 





 


Urine Bilirubin


 


 Negative


(NEGATIVE) 


 





 


Urine Urobilinogen


 


 Normal MG/DL


(0.0-1.0) 


 





 


Urine Leukocyte Esterase


 


 Negative


(NEGATIVE) 


 





 


Urine RBC


 


 2-4 /HPF (0 -


0) 


 





 


Urine WBC


 


 2-4 /HPF (0 -


0) 


 





 


Urine Squamous Epithelial


Cells 


 Occasional


/LPF 


 





 


Urine Bacteria


 


 Few /HPF


(NONE) 


 





 


Urine Hyaline Casts


 


 10-15 /LPF


(NONE) 


 





 


Urine Random Sodium


 


 < 20 mmol/L


() 


 





 


Urine Opiates Screen


 


 Negative


(NEGATIVE) 


 





 


Urine Barbiturates Screen


 


 Negative


(NEGATIVE) 


 





 


Phencyclidine (PCP) Screen


 


 Negative


(NEGATIVE) 


 





 


Urine Amphetamines Screen


 


 Negative


(NEGATIVE) 


 





 


Urine Benzodiazepines Screen


 


 Negative


(NEGATIVE) 


 





 


Urine Cocaine Screen


 


 Negative


(NEGATIVE) 


 





 


Urine Marijuana (THC) Screen


 


 Positive


(NEGATIVE) 


 





 


Hemoglobin A1c


 


 


 6.0 %


(4.3-6.0) 





 


Lactic Acid Level


 


 


 0.60 mmol/L


(0.4-2.0) 





 


Uric Acid


 


 


 6.4 MG/DL


(2.6-7.2) 





 


Phosphorus Level


 


 


 2.4 MG/DL


(2.5-4.9) 





 


Magnesium Level


 


 


 2.6 MG/DL


(1.8-2.4) 





 


Gamma Glutamyl Transpeptidase   18 U/L (5-85)  


 


C-Reactive Protein,


Quantitative 


 


 < 0.4 mg/dL


(0.00-0.90) 





 


Pro-B-Type Natriuretic Peptide


 


 


 47 pg/mL


(0-125) 





 


Triglycerides Level


 


 


 76 MG/DL


() 





 


Cholesterol Level


 


 


 99 MG/DL (<


200) 





 


LDL Cholesterol


 


 


 54 mg/dL


(<100) 





 


HDL Cholesterol


 


 


 34 MG/DL


(40-60) 





 


Cholesterol/HDL Ratio   2.9 (3.3-4.4)  


 


Vitamin B12 Level


 


 


 1218 PG/ML


(193-986) 





 


Folate


 


 


 6.2 NG/ML


(8.6-58.9) 





 


Thyroid Stimulating Hormone


(TSH) 


 


 1.082 uiU/mL


(0.358-3.740) 





 


Stool Occult Blood


 


 


 


 Negative


(NEGATIVE)


 


Test


 9/30/20


05:50 


 


 





 


White Blood Count


 4.2 K/UL


(4.8-10.8) 


 


 





 


Red Blood Count


 4.15 M/UL


(4.70-6.10) 


 


 





 


Hemoglobin


 11.7 G/DL


(14.2-18.0) 


 


 





 


Hematocrit


 35.2 %


(42.0-52.0) 


 


 





 


Mean Corpuscular Volume 85 FL (80-99)    


 


Mean Corpuscular Hemoglobin


 28.1 PG


(27.0-31.0) 


 


 





 


Mean Corpuscular Hemoglobin


Concent 33.1 G/DL


(32.0-36.0) 


 


 





 


Red Cell Distribution Width


 11.8 %


(11.6-14.8) 


 


 





 


Platelet Count


 202 K/UL


(150-450) 


 


 





 


Mean Platelet Volume


 6.0 FL


(6.5-10.1) 


 


 





 


Neutrophils (%) (Auto)


 39.2 %


(45.0-75.0) 


 


 





 


Lymphocytes (%) (Auto)


 44.3 %


(20.0-45.0) 


 


 





 


Monocytes (%) (Auto)


 11.7 %


(1.0-10.0) 


 


 





 


Eosinophils (%) (Auto)


 3.1 %


(0.0-3.0) 


 


 





 


Basophils (%) (Auto)


 1.6 %


(0.0-2.0) 


 


 





 


Sodium Level


 141 MMOL/L


(136-145) 


 


 





 


Potassium Level


 4.4 MMOL/L


(3.5-5.1) 


 


 





 


Chloride Level


 109 MMOL/L


() 


 


 





 


Carbon Dioxide Level


 27 MMOL/L


(21-32) 


 


 





 


Anion Gap


 5 mmol/L


(5-15) 


 


 





 


Blood Urea Nitrogen


 20 mg/dL


(7-18) 


 


 





 


Creatinine


 1.5 MG/DL


(0.55-1.30) 


 


 





 


Estimat Glomerular Filtration


Rate > 60 mL/min


(>60) 


 


 





 


Glucose Level


 73 MG/DL


() 


 


 





 


Uric Acid


 5.0 MG/DL


(2.6-7.2) 


 


 





 


Calcium Level


 7.9 MG/DL


(8.5-10.1) 


 


 





 


Phosphorus Level


 2.1 MG/DL


(2.5-4.9) 


 


 





 


Magnesium Level


 2.1 MG/DL


(1.8-2.4) 


 


 





 


Total Bilirubin


 0.2 MG/DL


(0.2-1.0) 


 


 





 


Aspartate Amino Transf


(AST/SGOT) 26 U/L (15-37) 


 


 


 





 


Alanine Aminotransferase


(ALT/SGPT) 27 U/L (12-78) 


 


 


 





 


Alkaline Phosphatase


 36 U/L


() 


 


 





 


Total Creatine Kinase


 378 U/L


() 


 


 





 


C-Reactive Protein,


Quantitative 0.6 mg/dL


(0.00-0.90) 


 


 





 


Pro-B-Type Natriuretic Peptide


 226 pg/mL


(0-125) 


 


 





 


Total Protein


 5.5 G/DL


(6.4-8.2) 


 


 





 


Albumin


 3.0 G/DL


(3.4-5.0) 


 


 





 


Globulin 2.5 g/dL    


 


Albumin/Globulin Ratio 1.2 (1.0-2.7)    


 


Amylase Level


 88 U/L


() 


 


 





 


Lipase


 520 U/L


() 


 


 





 


HIV (1&2) Antibody Rapid


 Negative


(NEGATIVE) 


 


 











Height (Feet):  5


Height (Inches):  8.00


Weight (Pounds):  172


Objective





Physical Exam


General:  well appearing, no apparent distress, alert


Heent:  normocephalic, atraumatic


Neck:  supple, thyroid normal, supple/symm/no masses


Respiratory:  lungs clear, no respiratory distress, no retraction, no accessory 

muscle use


Cardiov:  normal peripheral pulses, no edema, no gallop, no murmur, tylre


GI:  soft, no guarding, no rebound, tenderness - Epigastrically


Musk:  normal inspection


Neurologic:  alert, oriented x3


Psychiatric:  mood/affect normal


Skin:  no rash, warm/dry











Lucas Fuchs MD           Oct 1, 2020 06:31

## 2020-10-01 NOTE — NUR
CASE MANAGEMENT: REVIEW







SI: ALCOHOL ABUSE . ABD PAIN 

T 98.2 HR 57 RR 18 /71 SAT 97% ROOM AIR









IS: THIAMIN PO QD

FOLIC ACID PO QD

PROTONIX PO Q12HR 







***TELEMETRY UNIT STATUS***

DCP: PATIENT IS FROM HOME
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
## 2020-10-01 NOTE — NUR
NURSE NOTES:

Received report from Jenny Beyer RN.  Patient is sitting on floor next to nursing station 
asking to be discharged, Chey Grimm, Charge Nurse informed, GIL Cancino informed.  
Dr. Don Raines contacted and will be in to see the patient after 1 p.m.  Patient in no 
apparent distress.

## 2020-10-01 NOTE — GENERAL PROGRESS NOTE
Subjective


ROS Limited/Unobtainable:  Yes


Allergies:  


Coded Allergies:  


     No Known Allergies (Unverified , 1/17/20)





Objective





Last 24 Hour Vital Signs








  Date Time  Temp Pulse Resp B/P (MAP) Pulse Ox O2 Delivery O2 Flow Rate FiO2


 


10/1/20 08:00 98.2 69 18 124/71 (88) 97   


 


10/1/20 04:03  57      


 


10/1/20 00:00 98.6 90 18 110/75 (87) 100   


 


10/1/20 00:00  79      


 


9/30/20 21:00      Room Air  


 


9/30/20 20:00  86      


 


9/30/20 20:00 99.4 74 20 124/78 (93) 99   


 


9/30/20 16:00 98.9 73 18 125/68 (87) 98   


 


9/30/20 16:00  73      


 


9/30/20 12:00  79      


 


9/30/20 12:00 98.7 79 18 121/74 (90) 99   








Height (Feet):  5


Height (Inches):  8.00


Weight (Pounds):  172


General Appearance:  alert


EENT:  PERRL/EOMI


Neck:  normal alignment


Cardiovascular:  normal rate


Respiratory/Chest:  lungs clear


Abdomen:  normal bowel sounds, non tender, soft


Extremities:  non-tender





Assessment/Plan


Problem List:  


(1) Alcohol abuse


ICD Codes:  F10.10 - Alcohol abuse, uncomplicated


SNOMED:  08372054


(2) Elevated lipase


ICD Codes:  R74.8 - Abnormal levels of other serum enzymes


SNOMED:  420592298


(3) Hyponatremia


ICD Codes:  E87.1 - Hypo-osmolality and hyponatremia


SNOMED:  63567044


(4) Dehydration


ICD Codes:  E86.0 - Dehydration


SNOMED:  11045364


(5) Abdominal pain


ICD Codes:  R10.9 - Unspecified abdominal pain


SNOMED:  51226854


Qualifiers:  


   Qualified Codes:  R10.9 - Unspecified abdominal pain


Status:  stable, progressing


Assessment/Plan:


 reg diet


repeat labs


ppi


folic acid


neg stool ob


GI procedures on hold











Lars Ruvalcaba MD              Oct 1, 2020 11:37

## 2020-10-01 NOTE — NUR
***DISCHARGE PLANNING

DISCHARGE ORDER REQUESTED FROM DR GARCIA @ 4505 THIS MORNING

PER DR GARCIA HE WILL COME AND ASSESS FOR DISCHARGE

## 2020-10-01 NOTE — NUR
NURSE NOTES:



Patient is irate and demanding to be discharged by MD. Called Dr. Raines's exchange to inform 
him that patient desires to be discharged ASAP. Awaiting callback.

## 2020-10-01 NOTE — NEPHROLOGY PROGRESS NOTE
Assessment/Plan


Problem List:  


(1) RAY (acute kidney injury)


(2) Rhabdomyolysis


(3) Hyponatremia


(4) Dehydration


(5) Elevated lipase


Assessment


Acute renal failure, dehydration, possible rhabdo


Hyponatremia, electrolyte imbalances


EtOH abuse


Abdominal pain, gastritis, history of pancreatitis


Positive drug screen


Plan


October 1: Stable for discharge from renal standpoint of view.


September 30: Renal parameters improved.  CPK lower.  Patient's mental status 

baseline.  Okay to discharge from renal standpoint of view.  Patient will 

continue to abstain from using recreational drugs and increase oral liquid 

intake.


September 29: Normal saline down to 75 cc an hour.  Continue per GI.  Continue 

to monitor renal parameters.  Okay to transfer to [facility] from renal standpoint 

of view.





Hydrate with saline


Monitor renal parameters


Monitor hemoglobin hematocrit


Per orders





Subjective


ROS Limited/Unobtainable:  No





Objective


Objective





Last 24 Hour Vital Signs








  Date Time  Temp Pulse Resp B/P (MAP) Pulse Ox O2 Delivery O2 Flow Rate FiO2


 


10/1/20 04:03  57      


 


10/1/20 00:00 98.6 90 18 110/75 (87) 100   


 


10/1/20 00:00  79      


 


9/30/20 21:00      Room Air  


 


9/30/20 20:00  86      


 


9/30/20 20:00 99.4 74 20 124/78 (93) 99   


 


9/30/20 16:00 98.9 73 18 125/68 (87) 98   


 


9/30/20 16:00  73      


 


9/30/20 12:00  79      


 


9/30/20 12:00 98.7 79 18 121/74 (90) 99   








Height (Feet):  5


Height (Inches):  8.00


Weight (Pounds):  172


General Appearance:  no apparent distress


Objective


No change











Jan Felipe MD             Oct 1, 2020 09:04

## 2020-10-05 NOTE — DISCHARGE SUMMARY
Discharge Summary


Discharge Summary


_


DATE OF ADMISSION: 9/28/2020





DATE OF DISCHARGE: 10/01/2020








DISCHARGED BY: Dr. Raines





REASON FOR ADMISSION: 


45 years old male with past medical history of pancreatitis , presented with  

epigastric pain started about 7 days ago.


Patient reported drinking alcohol after his brother death.  


Patient reported sharp pain aggravated with alcohol , constant  and associated  

with nausea and nonbloody nonbilious vomiting.  


No fever or chills.  





Laboratory   work-up revealed no leukocytosis ,hemoglobin 19.6, hematocrit 58.2 

,platelet count 271.


Sodium  127 , chloride 89.  


BUN 75, creatinine 3.2.  


Glucose 144.  


Stable AST and ALT , lipase 345.  


Total .  


Troponin negative, EKG revealed sinus tachycardia with   heart rate 105.  


Urine toxicology screen was positive for THC .


Rapid COVID-19 was negative .


Chest x-ray revealed no acute cardiopulmonary pathology. 


CT scan of the abdomen and pelvis revealed no definite acute intraabdominal 

abnormality.  


In emergency department patient received analgesic, antiemetic, 1 L of fluid and

admitted to telemetry floor for further management.


 


CONSULTANTS:


GI specialist Dr. Ruvalcaba


nephrologist Dr. Felipe


hematologist/oncologist Dr. Fuchs


 


 


 


[facility] COURSE: 


Patient admitted  to telemetry floor.  


Patient started on aggressive IV hydration  with a saline solution.  


Pain management was addressed as needed.  


Symptomatic treatment provided.  


Patient initially was kept n.p.o. and slowly started on diet and was advanced as

tolerated.  


Patient started on proton pump inhibitors.  


Antiemetic provided as needed.  





Renal  parameters and  electrolytes were closely monitored.  


Nephrotoxic's were avoided.  Electrolytes corrected as needed..  


With IV hydration sodium up to 141.  


CK trended down to 378 from initial 635.  





With IV hydration hemoglobin down to 11.7 , hematocrit 35.2.  


Stool for occult blood was negative.  


Patient was found to have folate deficiency.


Patient started on folic acid supplements as well as a thiamine.  


GI specialist recommended consider GI procedure as outpatient . 


No need for urgent GI procedure at this time.





Patient demonstrated  mild leukopenia with WBC 4.2.


Hepatitis panel was negative, HIV test was negative.  


Leukopenia was possibly due to underlying liver disease versus EtOH 

myelosuppression.





Patient was counseled on alcohol cessation and limit use of marijuana.  


Abdominal pain was most likely due to gastritis .


Patient was recommended to continue  PPI.   





Patient clinically stabilized: was able to tolerate diet,  and pain resolved. 


Patient was ready for discharge home.





FINAL DIAGNOSES: 


Acute renal failure likely due to dehydration


Rhabdomyolysis


Dehydration


Hyponatremia-resolved 


Anemia of chronic disease


Folate deficiency


ETOH abuse


Abdominal pain


History of pancreatitis


Gastritis


Marijuana user





DISCHARGE MEDICATIONS:


See Medication Reconciliation list.





DISCHARGE INSTRUCTIONS:


Patient was discharged home.


Follow-up with a primary care provider in 1 week.





I have been assigned to dictate discharge summary for this account.


I was not involved in the patient's management.











Kenia Guadarrama NP                 Oct 5, 2020 08:48

## 2022-03-02 ENCOUNTER — HOSPITAL ENCOUNTER (EMERGENCY)
Age: 47
Discharge: HOME | End: 2022-03-02
Attending: EMERGENCY MEDICINE
Payer: MEDICAID

## 2022-03-02 ENCOUNTER — APPOINTMENT (OUTPATIENT)
Dept: GENERAL RADIOLOGY | Age: 47
End: 2022-03-02
Attending: PHYSICIAN ASSISTANT
Payer: MEDICAID

## 2022-03-02 VITALS
RESPIRATION RATE: 16 BRPM | HEIGHT: 68 IN | HEART RATE: 88 BPM | TEMPERATURE: 98.9 F | SYSTOLIC BLOOD PRESSURE: 136 MMHG | OXYGEN SATURATION: 99 % | DIASTOLIC BLOOD PRESSURE: 107 MMHG | WEIGHT: 140 LBS | BODY MASS INDEX: 21.22 KG/M2

## 2022-03-02 DIAGNOSIS — S89.91XA INJURY OF RIGHT KNEE, INITIAL ENCOUNTER: Primary | ICD-10-CM

## 2022-03-02 DIAGNOSIS — S00.81XA ABRASION OF FACE, INITIAL ENCOUNTER: ICD-10-CM

## 2022-03-02 DIAGNOSIS — M25.461 EFFUSION OF RIGHT KNEE: ICD-10-CM

## 2022-03-02 DIAGNOSIS — S09.90XA CLOSED HEAD INJURY, INITIAL ENCOUNTER: ICD-10-CM

## 2022-03-02 PROCEDURE — 90471 IMMUNIZATION ADMIN: CPT

## 2022-03-02 PROCEDURE — 99284 EMERGENCY DEPT VISIT MOD MDM: CPT

## 2022-03-02 PROCEDURE — 74011250637 HC RX REV CODE- 250/637: Performed by: PHYSICIAN ASSISTANT

## 2022-03-02 PROCEDURE — 74011250636 HC RX REV CODE- 250/636: Performed by: PHYSICIAN ASSISTANT

## 2022-03-02 PROCEDURE — 73562 X-RAY EXAM OF KNEE 3: CPT

## 2022-03-02 PROCEDURE — 90715 TDAP VACCINE 7 YRS/> IM: CPT | Performed by: PHYSICIAN ASSISTANT

## 2022-03-02 RX ORDER — IBUPROFEN 400 MG/1
800 TABLET ORAL
Status: COMPLETED | OUTPATIENT
Start: 2022-03-02 | End: 2022-03-02

## 2022-03-02 RX ADMIN — IBUPROFEN 800 MG: 400 TABLET, FILM COATED ORAL at 12:26

## 2022-03-02 RX ADMIN — TETANUS TOXOID, REDUCED DIPHTHERIA TOXOID AND ACELLULAR PERTUSSIS VACCINE, ADSORBED 0.5 ML: 5; 2.5; 8; 8; 2.5 SUSPENSION INTRAMUSCULAR at 12:26
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
## 2022-03-02 NOTE — ED NOTES
Patient (s) was given copy of dc instructions and 0 paper script(s) and 0 electronic scripts. Patient (s)  verbalized understanding of instructions and script (s). Patient given a current medication reconciliation form and verbalized understanding of their medications. Patient (s) verbalized understanding of the importance of discussing medications with  his or her physician or clinic they will be following up with. Patient alert and oriented and in no acute distress. Patient offered wheelchair from treatment area to hospital entrance, patient used wheelchair.
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
## 2024-07-15 NOTE — NUR
NURSE NOTES:

Received pt from JUAREZ Valdez. Pt awake, alert, and talkative. Called and left a message with 
Dr. Minna wong. Awaiting call back. Bed in lowest position. Call light within reach. 
Will continue to monitor. 2-3.9 cm

## 2025-02-17 ENCOUNTER — HOSPITAL ENCOUNTER (EMERGENCY)
Dept: HOSPITAL 87 - ER | Age: 50
Discharge: HOME | End: 2025-02-17
Payer: COMMERCIAL

## 2025-02-17 VITALS — OXYGEN SATURATION: 97 %

## 2025-02-17 VITALS
DIASTOLIC BLOOD PRESSURE: 99 MMHG | TEMPERATURE: 98.7 F | OXYGEN SATURATION: 97 % | SYSTOLIC BLOOD PRESSURE: 140 MMHG | RESPIRATION RATE: 17 BRPM | HEART RATE: 132 BPM

## 2025-02-17 VITALS — WEIGHT: 198.42 LBS | HEIGHT: 69 IN | BODY MASS INDEX: 29.39 KG/M2

## 2025-02-17 DIAGNOSIS — Z87.19: ICD-10-CM

## 2025-02-17 DIAGNOSIS — R10.9: Primary | ICD-10-CM

## 2025-02-17 LAB
ALBUMIN SERPL BCP-MCNC: 5.4 G/DL (ref 3.2–4.8)
ALT SERPL W P-5'-P-CCNC: 12 IU/L (ref 10–49)
AST SERPL W P-5'-P-CCNC: 23 IU/L (ref ?–34)
BASOPHILS NFR BLD AUTO: 0.4 % (ref 0–2)
BILIRUB DIRECT SERPL-MCNC: 0.1 MG/DL (ref ?–3)
BILIRUB SERPL-MCNC: 0.5 MG/DL (ref 0.1–1)
BUN SERPL-MCNC: 19 MG/DL (ref 9–23)
CALCIUM SERPL-MCNC: 10.6 MG/DL (ref 8.7–10.4)
CARDIAC TROPONIN I PNL SERPL HS: < 4 NG/L (ref 3–53)
CARDIAC TROPONIN I PNL SERPL HS: < 4 NG/L (ref 3–53)
CHLORIDE SERPL-SCNC: 104 MEQ/L (ref 98–107)
CO2 SERPL-SCNC: 21 MEQ/L (ref 21–32)
CREAT SERPL-MCNC: 1.7 MG/DL (ref 0.6–1.3)
EOSINOPHIL NFR BLD AUTO: 0.2 % (ref 0–5)
ERYTHROCYTE [DISTWIDTH] IN BLOOD BY AUTOMATED COUNT: 14.3 % (ref 11.6–14.6)
GLUCOSE SERPL-MCNC: 179 MG/DL (ref 70–105)
HCT VFR BLD AUTO: 49.5 % (ref 42–52)
HGB BLD-MCNC: 16 G/DL (ref 14–18)
INR PPP: 1
LYMPHOCYTES NFR BLD AUTO: 8.8 % (ref 20–50)
MCH RBC QN AUTO: 28.6 PG (ref 28–32)
MCHC RBC AUTO-ENTMCNC: 32.3 G/DL (ref 31–37)
MCV RBC AUTO: 88.7 FL (ref 80–94)
MONOCYTES NFR BLD AUTO: 3.8 % (ref 2–8)
NEUTROPHILS NFR BLD AUTO: 86.8 % (ref 40–76)
PLATELET # BLD AUTO: 268 X1000/UL (ref 130–400)
PMV BLD AUTO: 7.7 FL (ref 7.4–10.4)
POTASSIUM SERPL-SCNC: 4.1 MEQ/L (ref 3.5–5.1)
PROT SERPL-MCNC: 8.8 G/DL (ref 6–8.3)
PROTHROMBIN TIME: 11.6 SEC (ref 9.6–11)
RBC # BLD AUTO: 5.59 MILL/UL (ref 4.7–6.1)
SODIUM SERPL-SCNC: 141 MEQ/L (ref 136–145)
WBC # BLD: 9.8 X1000/UL (ref 4.5–11)

## 2025-02-17 PROCEDURE — 71045 X-RAY EXAM CHEST 1 VIEW: CPT

## 2025-02-17 PROCEDURE — 80076 HEPATIC FUNCTION PANEL: CPT

## 2025-02-17 PROCEDURE — 74176 CT ABD & PELVIS W/O CONTRAST: CPT

## 2025-02-17 PROCEDURE — 96361 HYDRATE IV INFUSION ADD-ON: CPT

## 2025-02-17 PROCEDURE — 36415 COLL VENOUS BLD VENIPUNCTURE: CPT

## 2025-02-17 PROCEDURE — 83690 ASSAY OF LIPASE: CPT

## 2025-02-17 PROCEDURE — 82962 GLUCOSE BLOOD TEST: CPT

## 2025-02-17 PROCEDURE — 80048 BASIC METABOLIC PNL TOTAL CA: CPT

## 2025-02-17 PROCEDURE — 96374 THER/PROPH/DIAG INJ IV PUSH: CPT

## 2025-02-17 PROCEDURE — 93005 ELECTROCARDIOGRAM TRACING: CPT

## 2025-02-17 PROCEDURE — 85610 PROTHROMBIN TIME: CPT

## 2025-02-17 PROCEDURE — 99285 EMERGENCY DEPT VISIT HI MDM: CPT

## 2025-02-17 PROCEDURE — 84484 ASSAY OF TROPONIN QUANT: CPT

## 2025-02-17 PROCEDURE — 85025 COMPLETE CBC W/AUTO DIFF WBC: CPT

## 2025-02-17 RX ADMIN — DEXTROSE ONE MLS/HR: 5 SOLUTION INTRAVENOUS at 09:59

## 2025-02-17 RX ADMIN — MORPHINE SULFATE STA MG: 4 INJECTION, SOLUTION INTRAMUSCULAR; INTRAVENOUS at 09:59
